# Patient Record
Sex: FEMALE | Race: ASIAN | NOT HISPANIC OR LATINO | URBAN - METROPOLITAN AREA
[De-identification: names, ages, dates, MRNs, and addresses within clinical notes are randomized per-mention and may not be internally consistent; named-entity substitution may affect disease eponyms.]

---

## 2017-07-26 ENCOUNTER — INPATIENT (INPATIENT)
Facility: HOSPITAL | Age: 32
LOS: 1 days | Discharge: HOME | End: 2017-07-28
Attending: OBSTETRICS & GYNECOLOGY | Admitting: OBSTETRICS & GYNECOLOGY
Payer: COMMERCIAL

## 2017-07-26 VITALS — WEIGHT: 185.19 LBS | HEIGHT: 66 IN

## 2017-07-26 DIAGNOSIS — Z34.80 ENCOUNTER FOR SUPERVISION OF OTHER NORMAL PREGNANCY, UNSPECIFIED TRIMESTER: ICD-10-CM

## 2017-07-26 DIAGNOSIS — Z3A.00 WEEKS OF GESTATION OF PREGNANCY NOT SPECIFIED: ICD-10-CM

## 2017-07-26 DIAGNOSIS — O26.899 OTHER SPECIFIED PREGNANCY RELATED CONDITIONS, UNSPECIFIED TRIMESTER: ICD-10-CM

## 2017-07-26 LAB
APTT BLD: 27.6 SEC — SIGNIFICANT CHANGE UP (ref 27.5–37.4)
BASOPHILS # BLD AUTO: 0.1 K/UL — SIGNIFICANT CHANGE UP (ref 0–0.2)
BASOPHILS # BLD AUTO: 0.1 K/UL — SIGNIFICANT CHANGE UP (ref 0–0.2)
BASOPHILS NFR BLD AUTO: 0.6 % — SIGNIFICANT CHANGE UP (ref 0–2)
BASOPHILS NFR BLD AUTO: 1 % — SIGNIFICANT CHANGE UP (ref 0–2)
EOSINOPHIL # BLD AUTO: 0.1 K/UL — SIGNIFICANT CHANGE UP (ref 0–0.5)
EOSINOPHIL # BLD AUTO: 0.5 K/UL — SIGNIFICANT CHANGE UP (ref 0–0.5)
EOSINOPHIL NFR BLD AUTO: 1.1 % — SIGNIFICANT CHANGE UP (ref 0–6)
EOSINOPHIL NFR BLD AUTO: 4.5 % — SIGNIFICANT CHANGE UP (ref 0–6)
FIBRINOGEN PPP-MCNC: 834 MG/DL — HIGH (ref 310–510)
HCT VFR BLD CALC: 27.6 % — LOW (ref 34.5–45)
HCT VFR BLD CALC: 36.7 % — SIGNIFICANT CHANGE UP (ref 34.5–45)
HGB BLD-MCNC: 12.4 G/DL — SIGNIFICANT CHANGE UP (ref 11.5–15.5)
HGB BLD-MCNC: 9.5 G/DL — LOW (ref 11.5–15.5)
INR BLD: 0.96 RATIO — SIGNIFICANT CHANGE UP (ref 0.88–1.16)
LYMPHOCYTES # BLD AUTO: 1.9 K/UL — SIGNIFICANT CHANGE UP (ref 1–3.3)
LYMPHOCYTES # BLD AUTO: 15.5 % — SIGNIFICANT CHANGE UP (ref 13–44)
LYMPHOCYTES # BLD AUTO: 2.9 K/UL — SIGNIFICANT CHANGE UP (ref 1–3.3)
LYMPHOCYTES # BLD AUTO: 28.2 % — SIGNIFICANT CHANGE UP (ref 13–44)
MCHC RBC-ENTMCNC: 25.8 PG — LOW (ref 27–34)
MCHC RBC-ENTMCNC: 26.4 PG — LOW (ref 27–34)
MCHC RBC-ENTMCNC: 33.8 GM/DL — SIGNIFICANT CHANGE UP (ref 32–36)
MCHC RBC-ENTMCNC: 34.5 GM/DL — SIGNIFICANT CHANGE UP (ref 32–36)
MCV RBC AUTO: 76.3 FL — LOW (ref 80–100)
MCV RBC AUTO: 76.6 FL — LOW (ref 80–100)
MONOCYTES # BLD AUTO: 1.1 K/UL — HIGH (ref 0–0.9)
MONOCYTES # BLD AUTO: 1.2 K/UL — HIGH (ref 0–0.9)
MONOCYTES NFR BLD AUTO: 10.8 % — SIGNIFICANT CHANGE UP (ref 2–14)
MONOCYTES NFR BLD AUTO: 9.7 % — SIGNIFICANT CHANGE UP (ref 2–14)
NEUTROPHILS # BLD AUTO: 5.6 K/UL — SIGNIFICANT CHANGE UP (ref 1.8–7.4)
NEUTROPHILS # BLD AUTO: 8.9 K/UL — HIGH (ref 1.8–7.4)
NEUTROPHILS NFR BLD AUTO: 55.5 % — SIGNIFICANT CHANGE UP (ref 43–77)
NEUTROPHILS NFR BLD AUTO: 73.1 % — SIGNIFICANT CHANGE UP (ref 43–77)
PLATELET # BLD AUTO: 246 K/UL — SIGNIFICANT CHANGE UP (ref 150–400)
PLATELET # BLD AUTO: 291 K/UL — SIGNIFICANT CHANGE UP (ref 150–400)
PROTHROM AB SERPL-ACNC: 10.5 SEC — SIGNIFICANT CHANGE UP (ref 9.8–12.7)
RBC # BLD: 3.61 M/UL — LOW (ref 3.8–5.2)
RBC # BLD: 4.8 M/UL — SIGNIFICANT CHANGE UP (ref 3.8–5.2)
RBC # FLD: 14 % — SIGNIFICANT CHANGE UP (ref 10.3–14.5)
RBC # FLD: 14.1 % — SIGNIFICANT CHANGE UP (ref 10.3–14.5)
T PALLIDUM AB TITR SER: NEGATIVE — SIGNIFICANT CHANGE UP
WBC # BLD: 10.2 K/UL — SIGNIFICANT CHANGE UP (ref 3.8–10.5)
WBC # BLD: 12.2 K/UL — HIGH (ref 3.8–10.5)
WBC # FLD AUTO: 10.2 K/UL — SIGNIFICANT CHANGE UP (ref 3.8–10.5)
WBC # FLD AUTO: 12.2 K/UL — HIGH (ref 3.8–10.5)

## 2017-07-26 RX ORDER — OXYTOCIN 10 UNIT/ML
41.67 VIAL (ML) INJECTION
Qty: 20 | Refills: 0 | Status: DISCONTINUED | OUTPATIENT
Start: 2017-07-26 | End: 2017-07-28

## 2017-07-26 RX ORDER — FOLIC ACID 0.8 MG
1 TABLET ORAL DAILY
Qty: 0 | Refills: 0 | Status: DISCONTINUED | OUTPATIENT
Start: 2017-07-26 | End: 2017-07-28

## 2017-07-26 RX ORDER — FERROUS SULFATE 325(65) MG
325 TABLET ORAL DAILY
Qty: 0 | Refills: 0 | Status: DISCONTINUED | OUTPATIENT
Start: 2017-07-26 | End: 2017-07-27

## 2017-07-26 RX ORDER — HYDROCORTISONE 1 %
1 OINTMENT (GRAM) TOPICAL EVERY 4 HOURS
Qty: 0 | Refills: 0 | Status: DISCONTINUED | OUTPATIENT
Start: 2017-07-26 | End: 2017-07-28

## 2017-07-26 RX ORDER — OXYCODONE AND ACETAMINOPHEN 5; 325 MG/1; MG/1
2 TABLET ORAL EVERY 4 HOURS
Qty: 0 | Refills: 0 | Status: DISCONTINUED | OUTPATIENT
Start: 2017-07-26 | End: 2017-07-28

## 2017-07-26 RX ORDER — SIMETHICONE 80 MG/1
80 TABLET, CHEWABLE ORAL EVERY 6 HOURS
Qty: 0 | Refills: 0 | Status: DISCONTINUED | OUTPATIENT
Start: 2017-07-26 | End: 2017-07-28

## 2017-07-26 RX ORDER — TETANUS TOXOID, REDUCED DIPHTHERIA TOXOID AND ACELLULAR PERTUSSIS VACCINE, ADSORBED 5; 2.5; 8; 8; 2.5 [IU]/.5ML; [IU]/.5ML; UG/.5ML; UG/.5ML; UG/.5ML
0.5 SUSPENSION INTRAMUSCULAR ONCE
Qty: 0 | Refills: 0 | Status: DISCONTINUED | OUTPATIENT
Start: 2017-07-26 | End: 2017-07-28

## 2017-07-26 RX ORDER — LANOLIN
1 OINTMENT (GRAM) TOPICAL EVERY 6 HOURS
Qty: 0 | Refills: 0 | Status: DISCONTINUED | OUTPATIENT
Start: 2017-07-26 | End: 2017-07-28

## 2017-07-26 RX ORDER — SODIUM CHLORIDE 9 MG/ML
1000 INJECTION, SOLUTION INTRAVENOUS
Qty: 0 | Refills: 0 | Status: DISCONTINUED | OUTPATIENT
Start: 2017-07-26 | End: 2017-07-26

## 2017-07-26 RX ORDER — DIPHENHYDRAMINE HCL 50 MG
25 CAPSULE ORAL EVERY 6 HOURS
Qty: 0 | Refills: 0 | Status: DISCONTINUED | OUTPATIENT
Start: 2017-07-26 | End: 2017-07-28

## 2017-07-26 RX ORDER — SODIUM CHLORIDE 9 MG/ML
3 INJECTION INTRAMUSCULAR; INTRAVENOUS; SUBCUTANEOUS EVERY 8 HOURS
Qty: 0 | Refills: 0 | Status: DISCONTINUED | OUTPATIENT
Start: 2017-07-26 | End: 2017-07-28

## 2017-07-26 RX ORDER — OXYTOCIN 10 UNIT/ML
333.33 VIAL (ML) INJECTION
Qty: 20 | Refills: 0 | Status: DISCONTINUED | OUTPATIENT
Start: 2017-07-26 | End: 2017-07-26

## 2017-07-26 RX ORDER — ACETAMINOPHEN 500 MG
650 TABLET ORAL EVERY 4 HOURS
Qty: 0 | Refills: 0 | Status: DISCONTINUED | OUTPATIENT
Start: 2017-07-26 | End: 2017-07-28

## 2017-07-26 RX ORDER — BENZOCAINE 10 %
1 GEL (GRAM) MUCOUS MEMBRANE EVERY 6 HOURS
Qty: 0 | Refills: 0 | Status: DISCONTINUED | OUTPATIENT
Start: 2017-07-26 | End: 2017-07-28

## 2017-07-26 RX ORDER — PRAMOXINE HYDROCHLORIDE 150 MG/15G
1 AEROSOL, FOAM RECTAL EVERY 4 HOURS
Qty: 0 | Refills: 0 | Status: DISCONTINUED | OUTPATIENT
Start: 2017-07-26 | End: 2017-07-28

## 2017-07-26 RX ORDER — AER TRAVELER 0.5 G/1
1 SOLUTION RECTAL; TOPICAL EVERY 4 HOURS
Qty: 0 | Refills: 0 | Status: DISCONTINUED | OUTPATIENT
Start: 2017-07-26 | End: 2017-07-28

## 2017-07-26 RX ORDER — ACETAMINOPHEN 500 MG
650 TABLET ORAL EVERY 6 HOURS
Qty: 0 | Refills: 0 | Status: DISCONTINUED | OUTPATIENT
Start: 2017-07-26 | End: 2017-07-28

## 2017-07-26 RX ORDER — DIBUCAINE 1 %
1 OINTMENT (GRAM) RECTAL EVERY 4 HOURS
Qty: 0 | Refills: 0 | Status: DISCONTINUED | OUTPATIENT
Start: 2017-07-26 | End: 2017-07-28

## 2017-07-26 RX ORDER — CITRIC ACID/SODIUM CITRATE 300-500 MG
15 SOLUTION, ORAL ORAL EVERY 4 HOURS
Qty: 0 | Refills: 0 | Status: DISCONTINUED | OUTPATIENT
Start: 2017-07-26 | End: 2017-07-26

## 2017-07-26 RX ORDER — DOCUSATE SODIUM 100 MG
100 CAPSULE ORAL
Qty: 0 | Refills: 0 | Status: DISCONTINUED | OUTPATIENT
Start: 2017-07-26 | End: 2017-07-28

## 2017-07-26 RX ORDER — ZOLPIDEM TARTRATE 10 MG/1
5 TABLET ORAL AT BEDTIME
Qty: 0 | Refills: 0 | Status: DISCONTINUED | OUTPATIENT
Start: 2017-07-26 | End: 2017-07-28

## 2017-07-26 RX ORDER — SODIUM CHLORIDE 9 MG/ML
1000 INJECTION, SOLUTION INTRAVENOUS ONCE
Qty: 0 | Refills: 0 | Status: COMPLETED | OUTPATIENT
Start: 2017-07-26 | End: 2017-07-26

## 2017-07-26 RX ORDER — IBUPROFEN 200 MG
600 TABLET ORAL EVERY 6 HOURS
Qty: 0 | Refills: 0 | Status: DISCONTINUED | OUTPATIENT
Start: 2017-07-26 | End: 2017-07-28

## 2017-07-26 RX ADMIN — Medication 125 MILLIUNIT(S)/MIN: at 11:01

## 2017-07-26 RX ADMIN — SODIUM CHLORIDE 2000 MILLILITER(S): 9 INJECTION, SOLUTION INTRAVENOUS at 08:00

## 2017-07-26 RX ADMIN — OXYCODONE AND ACETAMINOPHEN 2 TABLET(S): 5; 325 TABLET ORAL at 10:09

## 2017-07-26 RX ADMIN — SODIUM CHLORIDE 125 MILLILITER(S): 9 INJECTION, SOLUTION INTRAVENOUS at 08:43

## 2017-07-26 RX ADMIN — Medication 325 MILLIGRAM(S): at 15:59

## 2017-07-26 RX ADMIN — SODIUM CHLORIDE 3 MILLILITER(S): 9 INJECTION INTRAMUSCULAR; INTRAVENOUS; SUBCUTANEOUS at 14:49

## 2017-07-26 RX ADMIN — SODIUM CHLORIDE 3 MILLILITER(S): 9 INJECTION INTRAMUSCULAR; INTRAVENOUS; SUBCUTANEOUS at 22:00

## 2017-07-26 RX ADMIN — Medication 1 TABLET(S): at 15:59

## 2017-07-26 RX ADMIN — Medication 1 MILLIGRAM(S): at 16:00

## 2017-07-26 RX ADMIN — Medication 600 MILLIGRAM(S): at 16:00

## 2017-07-26 NOTE — PATIENT PROFILE OB - WEIGHT: TOTAL WEIGHT IN KG
FREE:[LAST:[OB/GYN],PHONE:[(   )    -],FAX:[(   )    -],ADDRESS:[[address],   [place] [address]  Ph: [phone]]] 26 FREE:[LAST:[Tino],FIRST:[Galo],PHONE:[phone]:[(   )    -],ADDRESS:[[address] , [place] [address]]]

## 2017-07-27 ENCOUNTER — TRANSCRIPTION ENCOUNTER (OUTPATIENT)
Age: 32
End: 2017-07-27

## 2017-07-27 RX ORDER — FERROUS SULFATE 325(65) MG
325 TABLET ORAL
Qty: 0 | Refills: 0 | Status: DISCONTINUED | OUTPATIENT
Start: 2017-07-27 | End: 2017-07-28

## 2017-07-27 RX ORDER — ASCORBIC ACID 60 MG
500 TABLET,CHEWABLE ORAL DAILY
Qty: 0 | Refills: 0 | Status: DISCONTINUED | OUTPATIENT
Start: 2017-07-27 | End: 2017-07-28

## 2017-07-27 RX ADMIN — Medication 600 MILLIGRAM(S): at 08:51

## 2017-07-27 RX ADMIN — Medication 600 MILLIGRAM(S): at 15:40

## 2017-07-27 RX ADMIN — Medication 325 MILLIGRAM(S): at 12:02

## 2017-07-27 RX ADMIN — Medication 600 MILLIGRAM(S): at 09:30

## 2017-07-27 RX ADMIN — SODIUM CHLORIDE 3 MILLILITER(S): 9 INJECTION INTRAMUSCULAR; INTRAVENOUS; SUBCUTANEOUS at 22:28

## 2017-07-27 RX ADMIN — Medication 100 MILLIGRAM(S): at 12:02

## 2017-07-27 RX ADMIN — Medication 600 MILLIGRAM(S): at 15:07

## 2017-07-27 RX ADMIN — Medication 325 MILLIGRAM(S): at 08:51

## 2017-07-27 RX ADMIN — SODIUM CHLORIDE 3 MILLILITER(S): 9 INJECTION INTRAMUSCULAR; INTRAVENOUS; SUBCUTANEOUS at 14:15

## 2017-07-27 RX ADMIN — Medication 500 MILLIGRAM(S): at 12:04

## 2017-07-27 RX ADMIN — Medication 1 MILLIGRAM(S): at 12:04

## 2017-07-27 RX ADMIN — SODIUM CHLORIDE 3 MILLILITER(S): 9 INJECTION INTRAMUSCULAR; INTRAVENOUS; SUBCUTANEOUS at 07:04

## 2017-07-27 RX ADMIN — Medication 1 TABLET(S): at 12:04

## 2017-07-27 RX ADMIN — Medication 325 MILLIGRAM(S): at 17:05

## 2017-07-27 NOTE — DISCHARGE NOTE OB - CARE PLAN
Principal Discharge DX:	 (vaginal birth after )  Goal:	pain management and breast feeding  Instructions for follow-up, activity and diet:	Continue breastfeeding.  Motrin as needed for pain. Avoid strenuous activity, no heavy lifting. Nothing per vagina x 6 weeks - no sex, tampons, douching, tub baths, etc.  Follow up in office in 5-6 weeks for check up

## 2017-07-27 NOTE — DISCHARGE NOTE OB - PLAN OF CARE
pain management and breast feeding Continue breastfeeding.  Motrin as needed for pain. Avoid strenuous activity, no heavy lifting. Nothing per vagina x 6 weeks - no sex, tampons, douching, tub baths, etc.  Follow up in office in 5-6 weeks for check up

## 2017-07-27 NOTE — DISCHARGE NOTE OB - HOSPITAL COURSE
Patient admitted in active labor, wanting trial of labor after . s/p vaginal birth after ; Routine post partum care  Discharged home clinically stable

## 2017-07-27 NOTE — DISCHARGE NOTE OB - MEDICATION SUMMARY - MEDICATIONS TO TAKE
I will START or STAY ON the medications listed below when I get home from the hospital:    Motrin 600 mg oral tablet  -- 1 tab(s) by mouth 4 times a day, As Needed  -- Indication: For pain, as needed    benzocaine 20% topical spray  -- 1 spray(s) on skin every 6 hours, As needed, Perineal discomfort  -- Indication: For perineal pain    ferrous sulfate 325 mg (65 mg elemental iron) oral tablet  -- 1 tab(s) by mouth 2 times a day  -- Indication: For anemia    docusate sodium 100 mg oral capsule  -- 1 cap(s) by mouth 2 times a day, As needed, Stool Softening  -- Indication: For Stool softener    Dulcolax Laxative 5 mg oral delayed release tablet  -- 1 tab(s) by mouth once a day (at bedtime)  -- Swallow whole.  Do not crush.    -- Indication: For constipation, laxative

## 2017-07-27 NOTE — DISCHARGE NOTE OB - CARE PROVIDER_API CALL
Olanescu, Andrea D (MD), Obstetrics and Gynecology  [address] [address] [place] [address]  Phone: [phone]  Fax: [phone]

## 2017-07-27 NOTE — DISCHARGE NOTE OB - PATIENT PORTAL LINK FT
“You can access the FollowHealth Patient Portal, offered by [facility], by registering with the following website: [email or web address]”

## 2017-07-27 NOTE — PROGRESS NOTE ADULT - ASSESSMENT
Assessment  Patient is a 30 yo 1 day s/p  without complications.    Plan   continue pp care  pain management PRN, ibuprofen 600mg q6h, percocet 1 tab q4h   dermoplast prn perineal pain prn  encourage ambulation, breastfeeding

## 2017-07-28 VITALS
RESPIRATION RATE: 16 BRPM | HEART RATE: 71 BPM | SYSTOLIC BLOOD PRESSURE: 98 MMHG | TEMPERATURE: 98 F | OXYGEN SATURATION: 99 % | DIASTOLIC BLOOD PRESSURE: 56 MMHG

## 2017-07-28 PROCEDURE — 59050 FETAL MONITOR W/REPORT: CPT

## 2017-07-28 PROCEDURE — 86780 TREPONEMA PALLIDUM: CPT

## 2017-07-28 PROCEDURE — 86920 COMPATIBILITY TEST SPIN: CPT

## 2017-07-28 PROCEDURE — 59025 FETAL NON-STRESS TEST: CPT

## 2017-07-28 PROCEDURE — 86850 RBC ANTIBODY SCREEN: CPT

## 2017-07-28 PROCEDURE — 85384 FIBRINOGEN ACTIVITY: CPT

## 2017-07-28 PROCEDURE — G0463: CPT

## 2017-07-28 PROCEDURE — 36415 COLL VENOUS BLD VENIPUNCTURE: CPT

## 2017-07-28 PROCEDURE — 86901 BLOOD TYPING SEROLOGIC RH(D): CPT

## 2017-07-28 PROCEDURE — 86900 BLOOD TYPING SEROLOGIC ABO: CPT

## 2017-07-28 PROCEDURE — 85027 COMPLETE CBC AUTOMATED: CPT

## 2017-07-28 PROCEDURE — 85730 THROMBOPLASTIN TIME PARTIAL: CPT

## 2017-07-28 PROCEDURE — 85610 PROTHROMBIN TIME: CPT

## 2017-07-28 RX ORDER — BENZOCAINE 10 %
1 GEL (GRAM) MUCOUS MEMBRANE
Qty: 1 | Refills: 0
Start: 2017-07-28 | End: 2017-08-07

## 2017-07-28 RX ORDER — FERROUS SULFATE 325(65) MG
1 TABLET ORAL
Qty: 60 | Refills: 0
Start: 2017-07-28 | End: 2017-08-27

## 2017-07-28 RX ORDER — DOCUSATE SODIUM 100 MG
1 CAPSULE ORAL
Qty: 20 | Refills: 0
Start: 2017-07-28 | End: 2017-08-07

## 2017-07-28 RX ORDER — IBUPROFEN 200 MG
1 TABLET ORAL
Qty: 40 | Refills: 0
Start: 2017-07-28 | End: 2017-08-07

## 2017-07-28 RX ADMIN — SODIUM CHLORIDE 3 MILLILITER(S): 9 INJECTION INTRAMUSCULAR; INTRAVENOUS; SUBCUTANEOUS at 06:42

## 2017-07-28 RX ADMIN — Medication 325 MILLIGRAM(S): at 11:45

## 2017-07-28 RX ADMIN — Medication 500 MILLIGRAM(S): at 11:45

## 2017-07-28 RX ADMIN — Medication 1 TABLET(S): at 11:45

## 2017-07-28 RX ADMIN — Medication 1 MILLIGRAM(S): at 11:45

## 2017-07-28 RX ADMIN — Medication 325 MILLIGRAM(S): at 08:49

## 2017-07-28 NOTE — PROGRESS NOTE ADULT - SUBJECTIVE AND OBJECTIVE BOX
Patient seen resting comfortably.  No current complaints.  Denies HA, CP, SOB, N/V/D, dizziness, palpitations, worsening abdominal pain, worsening vaginal bleeding, or any other concerns.  Ambulating and voiding without difficulty.  Passing flatus and tolerating regular diet.  Attempting breastfeeding.     Vital Signs Last 24 Hrs  T(C): 36.7 (28 Jul 2017 05:48), Max: 36.7 (27 Jul 2017 19:50)  T(F): 98 (28 Jul 2017 05:48), Max: 98 (27 Jul 2017 19:50)  HR: 71 (28 Jul 2017 05:48) (71 - 86)  BP: 98/56 (28 Jul 2017 05:48) (98/56 - 118/71)  BP(mean): --  RR: 16 (28 Jul 2017 05:48) (16 - 16)  SpO2: 99% (28 Jul 2017 05:48) (99% - 99%)    Physical Exam:     Gen: A&Ox 3, NAD  Chest: CTABL  Cardiac: S1+S2+ RRR  Breast: Soft, nontender, nonengorged  Abdomen: Soft, nontender, Fundus firm below umbilicus, +BS  Gyn: Mild lochia, intact/repaired  Extremities: Nontender, DTRS 2+, no worsening edema                          9.5    12.2  )-----------( 246      ( 26 Jul 2017 20:46 )             27.6

## 2017-07-28 NOTE — PROGRESS NOTE ADULT - ASSESSMENT
PPD 2 in stable condition   A/P:  Postpartum day two in stable condition   - Discharge home with instructions  -Follow up in office in 5-6 weeks for postpartum visit  -Breastfeeding encouraged   -Continue pain management  -Encourage OOB and ambulation  -Continue current care

## 2017-08-01 DIAGNOSIS — Z3A.38 38 WEEKS GESTATION OF PREGNANCY: ICD-10-CM

## 2017-08-01 DIAGNOSIS — O34.211 MATERNAL CARE FOR LOW TRANSVERSE SCAR FROM PREVIOUS CESAREAN DELIVERY: ICD-10-CM

## 2021-03-22 ENCOUNTER — RESULT REVIEW (OUTPATIENT)
Age: 36
End: 2021-03-22

## 2021-03-22 ENCOUNTER — INPATIENT (INPATIENT)
Facility: HOSPITAL | Age: 36
LOS: 1 days | Discharge: HOME | End: 2021-03-24
Attending: SPECIALIST | Admitting: SPECIALIST
Payer: COMMERCIAL

## 2021-03-22 VITALS — SYSTOLIC BLOOD PRESSURE: 127 MMHG

## 2021-03-22 DIAGNOSIS — O26.899 OTHER SPECIFIED PREGNANCY RELATED CONDITIONS, UNSPECIFIED TRIMESTER: ICD-10-CM

## 2021-03-22 DIAGNOSIS — Z3A.00 WEEKS OF GESTATION OF PREGNANCY NOT SPECIFIED: ICD-10-CM

## 2021-03-22 DIAGNOSIS — Z98.891 HISTORY OF UTERINE SCAR FROM PREVIOUS SURGERY: Chronic | ICD-10-CM

## 2021-03-22 LAB
BASOPHILS # BLD AUTO: 0.04 K/UL — SIGNIFICANT CHANGE UP (ref 0–0.2)
BASOPHILS NFR BLD AUTO: 0.4 % — SIGNIFICANT CHANGE UP (ref 0–2)
EOSINOPHIL # BLD AUTO: 0.16 K/UL — SIGNIFICANT CHANGE UP (ref 0–0.5)
EOSINOPHIL NFR BLD AUTO: 1.4 % — SIGNIFICANT CHANGE UP (ref 0–6)
HCT VFR BLD CALC: 37.5 % — SIGNIFICANT CHANGE UP (ref 34.5–45)
HGB BLD-MCNC: 12.1 G/DL — SIGNIFICANT CHANGE UP (ref 11.5–15.5)
IANC: 8.48 K/UL — SIGNIFICANT CHANGE UP (ref 1.5–8.5)
IMM GRANULOCYTES NFR BLD AUTO: 1.2 % — SIGNIFICANT CHANGE UP (ref 0–1.5)
LYMPHOCYTES # BLD AUTO: 1.32 K/UL — SIGNIFICANT CHANGE UP (ref 1–3.3)
LYMPHOCYTES # BLD AUTO: 11.7 % — LOW (ref 13–44)
MCHC RBC-ENTMCNC: 25.4 PG — LOW (ref 27–34)
MCHC RBC-ENTMCNC: 32.3 GM/DL — SIGNIFICANT CHANGE UP (ref 32–36)
MCV RBC AUTO: 78.8 FL — LOW (ref 80–100)
MONOCYTES # BLD AUTO: 1.17 K/UL — HIGH (ref 0–0.9)
MONOCYTES NFR BLD AUTO: 10.4 % — SIGNIFICANT CHANGE UP (ref 2–14)
NEUTROPHILS # BLD AUTO: 8.48 K/UL — HIGH (ref 1.8–7.4)
NEUTROPHILS NFR BLD AUTO: 74.9 % — SIGNIFICANT CHANGE UP (ref 43–77)
NRBC # BLD: 0 /100 WBCS — SIGNIFICANT CHANGE UP
NRBC # FLD: 0 K/UL — SIGNIFICANT CHANGE UP
PLATELET # BLD AUTO: 272 K/UL — SIGNIFICANT CHANGE UP (ref 150–400)
RBC # BLD: 4.76 M/UL — SIGNIFICANT CHANGE UP (ref 3.8–5.2)
RBC # FLD: 15.3 % — HIGH (ref 10.3–14.5)
SARS-COV-2 RNA SPEC QL NAA+PROBE: SIGNIFICANT CHANGE UP
WBC # BLD: 11.3 K/UL — HIGH (ref 3.8–10.5)
WBC # FLD AUTO: 11.3 K/UL — HIGH (ref 3.8–10.5)

## 2021-03-22 PROCEDURE — 59409 OBSTETRICAL CARE: CPT | Mod: U9,UB,GC

## 2021-03-22 PROCEDURE — 88307 TISSUE EXAM BY PATHOLOGIST: CPT | Mod: 26

## 2021-03-22 RX ORDER — SIMETHICONE 80 MG/1
80 TABLET, CHEWABLE ORAL EVERY 4 HOURS
Refills: 0 | Status: DISCONTINUED | OUTPATIENT
Start: 2021-03-22 | End: 2021-03-24

## 2021-03-22 RX ORDER — HYDROCORTISONE 1 %
1 OINTMENT (GRAM) TOPICAL EVERY 6 HOURS
Refills: 0 | Status: DISCONTINUED | OUTPATIENT
Start: 2021-03-22 | End: 2021-03-24

## 2021-03-22 RX ORDER — ERTAPENEM SODIUM 1 G/1
1000 INJECTION, POWDER, LYOPHILIZED, FOR SOLUTION INTRAMUSCULAR; INTRAVENOUS ONCE
Refills: 0 | Status: COMPLETED | OUTPATIENT
Start: 2021-03-22 | End: 2021-03-22

## 2021-03-22 RX ORDER — SODIUM CHLORIDE 9 MG/ML
500 INJECTION, SOLUTION INTRAVENOUS
Refills: 0 | Status: COMPLETED | OUTPATIENT
Start: 2021-03-22 | End: 2021-03-22

## 2021-03-22 RX ORDER — OXYTOCIN 10 UNIT/ML
333.33 VIAL (ML) INJECTION
Qty: 20 | Refills: 0 | Status: DISCONTINUED | OUTPATIENT
Start: 2021-03-22 | End: 2021-03-23

## 2021-03-22 RX ORDER — SODIUM CHLORIDE 9 MG/ML
1000 INJECTION, SOLUTION INTRAVENOUS
Refills: 0 | Status: DISCONTINUED | OUTPATIENT
Start: 2021-03-22 | End: 2021-03-22

## 2021-03-22 RX ORDER — GENTAMICIN SULFATE 40 MG/ML
350 VIAL (ML) INJECTION ONCE
Refills: 0 | Status: DISCONTINUED | OUTPATIENT
Start: 2021-03-22 | End: 2021-03-22

## 2021-03-22 RX ORDER — SODIUM CHLORIDE 9 MG/ML
500 INJECTION INTRAMUSCULAR; INTRAVENOUS; SUBCUTANEOUS ONCE
Refills: 0 | Status: DISCONTINUED | OUTPATIENT
Start: 2021-03-22 | End: 2021-03-22

## 2021-03-22 RX ORDER — DIPHENHYDRAMINE HCL 50 MG
25 CAPSULE ORAL EVERY 6 HOURS
Refills: 0 | Status: DISCONTINUED | OUTPATIENT
Start: 2021-03-22 | End: 2021-03-24

## 2021-03-22 RX ORDER — OXYTOCIN 10 UNIT/ML
2 VIAL (ML) INJECTION
Qty: 30 | Refills: 0 | Status: DISCONTINUED | OUTPATIENT
Start: 2021-03-22 | End: 2021-03-23

## 2021-03-22 RX ORDER — INFLUENZA VIRUS VACCINE 15; 15; 15; 15 UG/.5ML; UG/.5ML; UG/.5ML; UG/.5ML
0.5 SUSPENSION INTRAMUSCULAR ONCE
Refills: 0 | Status: COMPLETED | OUTPATIENT
Start: 2021-03-22 | End: 2021-03-22

## 2021-03-22 RX ORDER — MAGNESIUM HYDROXIDE 400 MG/1
30 TABLET, CHEWABLE ORAL
Refills: 0 | Status: DISCONTINUED | OUTPATIENT
Start: 2021-03-22 | End: 2021-03-24

## 2021-03-22 RX ORDER — LANOLIN
1 OINTMENT (GRAM) TOPICAL EVERY 6 HOURS
Refills: 0 | Status: DISCONTINUED | OUTPATIENT
Start: 2021-03-22 | End: 2021-03-24

## 2021-03-22 RX ORDER — SODIUM CHLORIDE 9 MG/ML
1000 INJECTION INTRAMUSCULAR; INTRAVENOUS; SUBCUTANEOUS
Refills: 0 | Status: DISCONTINUED | OUTPATIENT
Start: 2021-03-22 | End: 2021-03-22

## 2021-03-22 RX ORDER — DIBUCAINE 1 %
1 OINTMENT (GRAM) RECTAL EVERY 6 HOURS
Refills: 0 | Status: DISCONTINUED | OUTPATIENT
Start: 2021-03-22 | End: 2021-03-24

## 2021-03-22 RX ORDER — ACETAMINOPHEN 500 MG
975 TABLET ORAL ONCE
Refills: 0 | Status: COMPLETED | OUTPATIENT
Start: 2021-03-22 | End: 2021-03-22

## 2021-03-22 RX ORDER — OXYCODONE HYDROCHLORIDE 5 MG/1
5 TABLET ORAL
Refills: 0 | Status: DISCONTINUED | OUTPATIENT
Start: 2021-03-22 | End: 2021-03-24

## 2021-03-22 RX ORDER — AMPICILLIN TRIHYDRATE 250 MG
CAPSULE ORAL
Refills: 0 | Status: DISCONTINUED | OUTPATIENT
Start: 2021-03-22 | End: 2021-03-22

## 2021-03-22 RX ORDER — AER TRAVELER 0.5 G/1
1 SOLUTION RECTAL; TOPICAL EVERY 4 HOURS
Refills: 0 | Status: DISCONTINUED | OUTPATIENT
Start: 2021-03-22 | End: 2021-03-24

## 2021-03-22 RX ORDER — ACETAMINOPHEN 500 MG
975 TABLET ORAL
Refills: 0 | Status: DISCONTINUED | OUTPATIENT
Start: 2021-03-22 | End: 2021-03-24

## 2021-03-22 RX ORDER — IBUPROFEN 200 MG
600 TABLET ORAL EVERY 6 HOURS
Refills: 0 | Status: COMPLETED | OUTPATIENT
Start: 2021-03-22 | End: 2022-02-18

## 2021-03-22 RX ORDER — OXYTOCIN 10 UNIT/ML
333.33 VIAL (ML) INJECTION
Qty: 20 | Refills: 0 | Status: DISCONTINUED | OUTPATIENT
Start: 2021-03-22 | End: 2021-03-22

## 2021-03-22 RX ORDER — AMPICILLIN TRIHYDRATE 250 MG
2 CAPSULE ORAL ONCE
Refills: 0 | Status: COMPLETED | OUTPATIENT
Start: 2021-03-22 | End: 2021-03-22

## 2021-03-22 RX ORDER — KETOROLAC TROMETHAMINE 30 MG/ML
30 SYRINGE (ML) INJECTION ONCE
Refills: 0 | Status: DISCONTINUED | OUTPATIENT
Start: 2021-03-22 | End: 2021-03-22

## 2021-03-22 RX ORDER — OXYCODONE HYDROCHLORIDE 5 MG/1
5 TABLET ORAL ONCE
Refills: 0 | Status: DISCONTINUED | OUTPATIENT
Start: 2021-03-22 | End: 2021-03-24

## 2021-03-22 RX ORDER — LEVOTHYROXINE SODIUM 125 MCG
1 TABLET ORAL
Qty: 0 | Refills: 0 | DISCHARGE

## 2021-03-22 RX ORDER — SODIUM CHLORIDE 9 MG/ML
1000 INJECTION, SOLUTION INTRAVENOUS ONCE
Refills: 0 | Status: COMPLETED | OUTPATIENT
Start: 2021-03-22 | End: 2021-03-22

## 2021-03-22 RX ORDER — PRAMOXINE HYDROCHLORIDE 150 MG/15G
1 AEROSOL, FOAM RECTAL EVERY 4 HOURS
Refills: 0 | Status: DISCONTINUED | OUTPATIENT
Start: 2021-03-22 | End: 2021-03-24

## 2021-03-22 RX ORDER — TETANUS TOXOID, REDUCED DIPHTHERIA TOXOID AND ACELLULAR PERTUSSIS VACCINE, ADSORBED 5; 2.5; 8; 8; 2.5 [IU]/.5ML; [IU]/.5ML; UG/.5ML; UG/.5ML; UG/.5ML
0.5 SUSPENSION INTRAMUSCULAR ONCE
Refills: 0 | Status: DISCONTINUED | OUTPATIENT
Start: 2021-03-22 | End: 2021-03-24

## 2021-03-22 RX ORDER — SODIUM CHLORIDE 9 MG/ML
3 INJECTION INTRAMUSCULAR; INTRAVENOUS; SUBCUTANEOUS EVERY 8 HOURS
Refills: 0 | Status: DISCONTINUED | OUTPATIENT
Start: 2021-03-22 | End: 2021-03-24

## 2021-03-22 RX ORDER — SODIUM CHLORIDE 9 MG/ML
1000 INJECTION, SOLUTION INTRAVENOUS ONCE
Refills: 0 | Status: DISCONTINUED | OUTPATIENT
Start: 2021-03-22 | End: 2021-03-22

## 2021-03-22 RX ORDER — BENZOCAINE 10 %
1 GEL (GRAM) MUCOUS MEMBRANE EVERY 6 HOURS
Refills: 0 | Status: DISCONTINUED | OUTPATIENT
Start: 2021-03-22 | End: 2021-03-24

## 2021-03-22 RX ADMIN — SODIUM CHLORIDE 1000 MILLILITER(S): 9 INJECTION, SOLUTION INTRAVENOUS at 23:23

## 2021-03-22 RX ADMIN — SODIUM CHLORIDE 999 MILLILITER(S): 9 INJECTION, SOLUTION INTRAVENOUS at 15:31

## 2021-03-22 RX ADMIN — Medication 975 MILLIGRAM(S): at 21:29

## 2021-03-22 RX ADMIN — ERTAPENEM SODIUM 120 MILLIGRAM(S): 1 INJECTION, POWDER, LYOPHILIZED, FOR SOLUTION INTRAMUSCULAR; INTRAVENOUS at 23:07

## 2021-03-22 RX ADMIN — Medication 216 GRAM(S): at 21:37

## 2021-03-22 RX ADMIN — Medication 2 MILLIUNIT(S)/MIN: at 18:45

## 2021-03-22 RX ADMIN — Medication 1000 MILLIUNIT(S)/MIN: at 21:52

## 2021-03-22 RX ADMIN — Medication 30 MILLIGRAM(S): at 23:55

## 2021-03-22 RX ADMIN — SODIUM CHLORIDE 125 MILLILITER(S): 9 INJECTION, SOLUTION INTRAVENOUS at 19:07

## 2021-03-22 RX ADMIN — SODIUM CHLORIDE 125 MILLILITER(S): 9 INJECTION INTRAMUSCULAR; INTRAVENOUS; SUBCUTANEOUS at 20:44

## 2021-03-22 RX ADMIN — Medication 975 MILLIGRAM(S): at 23:06

## 2021-03-22 RX ADMIN — SODIUM CHLORIDE 125 MILLILITER(S): 9 INJECTION INTRAMUSCULAR; INTRAVENOUS; SUBCUTANEOUS at 20:27

## 2021-03-22 NOTE — OB RN DELIVERY SUMMARY - NS_SEPSISRSKCALC_OBGYN_ALL_OB_FT
EOS calculated successfully. EOS Risk Factor: 0.5/1000 live births ([facility] national incidence); GA=37w4d; Temp=101.48; ROM=21.8; GBS='Negative'; Antibiotics='No antibiotics or any antibiotics < 2 hrs prior to birth'

## 2021-03-22 NOTE — OB PROVIDER LABOR PROGRESS NOTE - ASSESSMENT
Admitted in Labor, SROM, TOLAC  - c/w pitocin  - maternal resuscitation  - peanut ball  - anticipate     C. Diamant, PGY-2  d/w Dr. Mccauley

## 2021-03-22 NOTE — OB PROVIDER TRIAGE NOTE - NSOBPROVIDERNOTE_OBGYN_ALL_OB_FT
[facility] Provider:  Dr Smith  EDC:  4/8/21.    Patient is a [age] y/o G P @ wks gestation who reports to triage with c/o since  Denies    AP Couse:  Meds -   Allergies    PMH  PSH  OB  GYN  SH    HR: 114 (22 Mar 2021 15:17) (114 - 116)  BP: 127/77 (22 Mar 2021 15:17) (127/77 - 127/-)  BP(mean): --  RR: 16 (22 Mar 2021 15:17) (16 - 16)  SpO2: 97% (22 Mar 2021 15:16) (97% - 97%)    Abdomen gravid, soft and nontender  Continue EFM  Grossly ruptured; clear fluids  SVE - 4-5/70/-3  Cephalic presentation  GBS - negative  Clinical EFW - 2782G    Discussed patient with   Plan: PNC Provider:  Dr Smith  EDC:  21.    Patient is a a y/o G P @ wks gestation who reports to triage with c/o since  Denies    AP Couse:  Meds -   Allergies    PMH/PSH/GYN/SADE - paz    Ob  - C/S - 2015 - 9lbs  -  - 2017 - 8+lbs    HR: 114 (22 Mar 2021 15:17) (114 - 116)  BP: 127/77 (22 Mar 2021 15:17) (127/77 - 127/-)  BP(mean): --  RR: 16 (22 Mar 2021 15:17) (16 - 16)  SpO2: 97% (22 Mar 2021 15:16) (97% - 97%)    Abdomen gravid, soft and nontender  Continue EFM  Grossly ruptured; clear fluids  SVE - 4-5/70/-3  Cephalic presentation  GBS - negative  Clinical EFW - 3629G    Discussed patient with   Plan: PNC Provider:  Dr Smith  EDC:  21.    Patient is a 35/o G 3 P  @ 37 4/7 wks gestation who reports to triage with c/o lof since Friday 3/19.  Denies vaginal bleeding.  Reports decreased fetal movements.    AP Course:  short/dilated cervix at 33wks.  States that she was given betamethasone and started on vaginal progesterone.  Meds - pnv & synthroid 112mcg daily  NKDA    PMH/PSH/GYN/SH - deneis  Ob  - C/S - 2015 - 9lbs  -  - 2017 - 8+lbs    HR: 114 (22 Mar 2021 15:17) (114 - 116)  BP: 127/77 (22 Mar 2021 15:17) (127/77 - 127/-)  BP(mean): --  RR: 16 (22 Mar 2021 15:17) (16 - 16)  SpO2: 97% (22 Mar 2021 15:16) (97% - 97%)    Abdomen gravid, soft and nontender  Continue EFM  Grossly ruptured; clear fluids  SVE - 4-5/70/-3  Cephalic presentation  GBS - negative  Clinical EFW - 3629G    Discussed patient with Dr. Biggs  Plan:  admit to l&dn for management. PN Provider:  Dr Smith  EDC:  21.    Patient is a 35/o G 3 P  @ 37 4/7 wks gestation who reports to triage with c/o lof since Friday 3/19.  Denies vaginal bleeding.  Reports decreased fetal movements.  Desires TOLAC.  AP Course:  short/dilated cervix at 33wks.  States that she was given betamethasone and started on vaginal progesterone.  Meds - pnv & synthroid 112mcg daily  Followed by Dr Hinton (endocrinologist) of [facility].  NKDA      PMH/PSH/GYN/SH - deneis  Ob  - C/S - 2015 - 9lbs  -  - 2017 - 8+lbs    HR: 114 (22 Mar 2021 15:17) (114 - 116)  BP: 127/77 (22 Mar 2021 15:17) (127/77 - 127/-)  RR: 16 (22 Mar 2021 15:17) (16 - 16)  SpO2: 97% (22 Mar 2021 15:16) (97% - 97%)    Abdomen gravid, soft and nontender  Continue EFM  Grossly ruptured; clear fluids  SVE - 4-5/70/-3  Cephalic presentation  GBS - negative  Clinical EFW - 3629G    Discussed patient with Dr. Biggs  Plan:  admit to l&d for management.

## 2021-03-22 NOTE — OB RN DELIVERY SUMMARY - NS_LABORCHARACTER_OBGYN_ALL_OB
Augmentation of labor/Febrile (>38C)/Internal electronic FM/External electronic FM/Antibiotics in labor/Chorioamnionitis

## 2021-03-22 NOTE — OB PROVIDER H&P - HISTORY OF PRESENT ILLNESS
PN Provider:  Dr Smith  EDC:  4/8/21.    Patient is a 35/o G 3 P 2002 @ 37 4/7 wks gestation who reports to triage with c/o lof since Friday 3/19.  Denies vaginal bleeding.  Reports decreased fetal movements.    AP Course:  short/dilated cervix at 33wks.  States that she was given betamethasone and started on vaginal progesterone.  Meds - pnv & synthroid 112mcg daily  NKDA

## 2021-03-22 NOTE — OB PROVIDER DELIVERY SUMMARY - NSPROVIDERDELIVERYNOTE_OBGYN_ALL_OB_FT
Spontaneous vaginal delivery of liveborn infant from SCOTT position. Head, shoulders, and body delivered easily. Infant was suctioned. No mec. Cord was clamped and cut and infant was passed to mother then peds. Placenta delivered intact with a 3 vessel cord. Fundal massage was given and uterine fundus was found to be firm. Vaginal exam revealed an intact cervix, vaginal walls and sulci. Patient had a 2nd degree laceration in the perineum that was repaired with 2.0 chromic suture. Excellent hemostasis was noted. Patient was stable. Count was correct x 2.    Meredith Guevara MD PGY4

## 2021-03-22 NOTE — OB PROVIDER TRIAGE NOTE - HISTORY OF PRESENT ILLNESS
PN Provider:  Dr Smith  EDC:  4/8/21.    Patient is a 35/o G 3 P 2002 @ 37 4/7 wks gestation who reports to triage with c/o lof since Friday 3/19.  Denies vaginal bleeding.  Reports decreased fetal movements.    AP Course:  short/dilated cervix at 33wks.  States that she was given betamethasone and started on vaginal progesterone.  Meds - pnv & synthroid 112mcg daily  NKDA       PN Provider:  Dr Smith  EDC:  4/8/21.    Patient is a 35/o G 3 P 2002 @ 37 4/7 wks gestation who reports to triage with c/o lof since Friday 3/19.  Denies vaginal bleeding.  Reports decreased fetal movements.  Desires TOLAC.  AP Course:  short/dilated cervix at 33wks.  States that she was given betamethasone and started on vaginal progesterone.  Meds - pnv & synthroid 112mcg daily  Followed by Dr Hinton (endocrinologist) of [facility].  NKDA

## 2021-03-22 NOTE — OB POSTPARTUM EVENT NOTE - NS_EVENTSUMMARY1_OBGYN_ALL_OB_FT
S/P  of viable female at 1324, delivery of placenta at 1327. Bilateral sulcul tears and 2nd degree noted by MD Mike with large amount of blood loss from site. 1 LR IV bolus, PIH labs, 2nd line placed, TXA administered and quick clot placed along with indwelling catheter by MD Mike. MD Griffiths and MD Diamond assisted at bedside. ************* THIS NOTE IS NOT VALID, ENTERED IN ERROR INCORRECT PATIENT CHART ************************

## 2021-03-22 NOTE — OB NEONATOLOGY/PEDIATRICIAN DELIVERY SUMMARY - NSPEDSNEONOTESA_OBGYN_ALL_OB_FT
This is a 37.4wk M born to a 34 y.o  mother via . Peds called to delivery for category 2 tracing. Maternal history significant for hypothyroidism (on synthroid). Pregnancy uncomplicated. Maternal BT A+. Prenatal labs are negative, non-reactive, and immune. GBS negative from 3/9. SROM at 0000 on 3/22, clear fluids. Baby emerged with terminal mec, crying spontaneously after stimulation. Cord clamping delayed x30 seconds. W/D/S/S. Void x1 in delivery room. APGARS 8/9. EOS: 3.21. Mom is COVID negative. Due to high EOS, baby admitted to NICU for r/u sepsis.  Would like to breast and bottlefeed, would like hep B, would like circ. This is a 37.4wk M born to a 34 y.o  mother via . Peds called to delivery for category 2 tracing. Maternal history significant for hypothyroidism (on synthroid). Pregnancy uncomplicated. Maternal BT A+. Prenatal labs are negative, non-reactive, and immune. GBS negative from 3/9. SROM at 0000 on 3/22, clear fluids. Baby emerged with terminal mec, crying spontaneously after stimulation. Cord clamping delayed x30 seconds. W/D/S/S. Void x1 in delivery room. APGARS 8/9. EOS: 3.21. Mom is COVID negative. Due to high EOS, baby admitted to NICU for r/o sepsis.  Would like to breast and bottlefeed, would like hep B, would like circ.

## 2021-03-22 NOTE — OB PROVIDER LABOR PROGRESS NOTE - ASSESSMENT
-start augmentation w/ pitocin  -IUPC placed  -patient counseled on early epidural in the case of uterine rupture and need for emergent cs, patient declines    d/w Dr. Jessi Serna PGY2

## 2021-03-22 NOTE — OB POSTPARTUM EVENT NOTE - NS_EVENTFINDINGS1_OBGYN_ALL_OB_FT
Repeat labs, continue to monitor. ************* THIS NOTE IS NOT VALID, ENTERED IN ERROR INCORRECT PATIENT CHART ************************

## 2021-03-22 NOTE — OB POSTPARTUM EVENT NOTE - NS_EVENTPTSUMMARY1_OBGYN_ALL_OB_FT
VS remained stable throughout PPH. ************* THIS NOTE IS NOT VALID, ENTERED IN ERROR INCORRECT PATIENT CHART ************************

## 2021-03-22 NOTE — OB PROVIDER H&P - NSHPPHYSICALEXAM_GEN_ALL_CORE
Vital Signs Last 24 Hrs  T(C): 37 (22 Mar 2021 15:17), Max: 37 (22 Mar 2021 15:17)  T(F): 98.6 (22 Mar 2021 15:17), Max: 98.6 (22 Mar 2021 15:17)  HR: 114 (22 Mar 2021 15:17) (114 - 116)  BP: 127/77 (22 Mar 2021 15:17) (127/77 - 127/-)  BP(mean): --  RR: 16 (22 Mar 2021 15:17) (16 - 16)  SpO2: 97% (22 Mar 2021 15:16) (97% - 97%)    Abdomen gravid, soft and nontender  Continue EFM  Grossly ruptured; clear fluids  SVE - 4-5/70/-3  Cephalic presentation  GBS - negative  Clinical EFW - 3629G

## 2021-03-23 ENCOUNTER — TRANSCRIPTION ENCOUNTER (OUTPATIENT)
Age: 36
End: 2021-03-23

## 2021-03-23 LAB
BASOPHILS # BLD AUTO: 0.05 K/UL — SIGNIFICANT CHANGE UP (ref 0–0.2)
BASOPHILS NFR BLD AUTO: 0.3 % — SIGNIFICANT CHANGE UP (ref 0–2)
COVID-19 SPIKE DOMAIN AB INTERP: NEGATIVE — SIGNIFICANT CHANGE UP
COVID-19 SPIKE DOMAIN ANTIBODY RESULT: 0.4 U/ML — SIGNIFICANT CHANGE UP
EOSINOPHIL # BLD AUTO: 0.15 K/UL — SIGNIFICANT CHANGE UP (ref 0–0.5)
EOSINOPHIL NFR BLD AUTO: 1 % — SIGNIFICANT CHANGE UP (ref 0–6)
HCT VFR BLD CALC: 29.5 % — LOW (ref 34.5–45)
HCT VFR BLD CALC: 34.3 % — LOW (ref 34.5–45)
HGB BLD-MCNC: 11.1 G/DL — LOW (ref 11.5–15.5)
HGB BLD-MCNC: 9.6 G/DL — LOW (ref 11.5–15.5)
IANC: 11.82 K/UL — HIGH (ref 1.5–8.5)
IMM GRANULOCYTES NFR BLD AUTO: 0.9 % — SIGNIFICANT CHANGE UP (ref 0–1.5)
LYMPHOCYTES # BLD AUTO: 1.31 K/UL — SIGNIFICANT CHANGE UP (ref 1–3.3)
LYMPHOCYTES # BLD AUTO: 8.9 % — LOW (ref 13–44)
MCHC RBC-ENTMCNC: 25.3 PG — LOW (ref 27–34)
MCHC RBC-ENTMCNC: 25.3 PG — LOW (ref 27–34)
MCHC RBC-ENTMCNC: 32.4 GM/DL — SIGNIFICANT CHANGE UP (ref 32–36)
MCHC RBC-ENTMCNC: 32.5 GM/DL — SIGNIFICANT CHANGE UP (ref 32–36)
MCV RBC AUTO: 77.6 FL — LOW (ref 80–100)
MCV RBC AUTO: 78.3 FL — LOW (ref 80–100)
MONOCYTES # BLD AUTO: 1.28 K/UL — HIGH (ref 0–0.9)
MONOCYTES NFR BLD AUTO: 8.7 % — SIGNIFICANT CHANGE UP (ref 2–14)
NEUTROPHILS # BLD AUTO: 11.82 K/UL — HIGH (ref 1.8–7.4)
NEUTROPHILS NFR BLD AUTO: 80.2 % — HIGH (ref 43–77)
NRBC # BLD: 0 /100 WBCS — SIGNIFICANT CHANGE UP
NRBC # BLD: 0 /100 WBCS — SIGNIFICANT CHANGE UP
NRBC # FLD: 0 K/UL — SIGNIFICANT CHANGE UP
NRBC # FLD: 0 K/UL — SIGNIFICANT CHANGE UP
PLATELET # BLD AUTO: 202 K/UL — SIGNIFICANT CHANGE UP (ref 150–400)
PLATELET # BLD AUTO: 223 K/UL — SIGNIFICANT CHANGE UP (ref 150–400)
RBC # BLD: 3.8 M/UL — SIGNIFICANT CHANGE UP (ref 3.8–5.2)
RBC # BLD: 4.38 M/UL — SIGNIFICANT CHANGE UP (ref 3.8–5.2)
RBC # FLD: 15.5 % — HIGH (ref 10.3–14.5)
RBC # FLD: 15.9 % — HIGH (ref 10.3–14.5)
SARS-COV-2 IGG+IGM SERPL QL IA: 0.4 U/ML — SIGNIFICANT CHANGE UP
SARS-COV-2 IGG+IGM SERPL QL IA: NEGATIVE — SIGNIFICANT CHANGE UP
T PALLIDUM AB TITR SER: NEGATIVE — SIGNIFICANT CHANGE UP
WBC # BLD: 14.75 K/UL — HIGH (ref 3.8–10.5)
WBC # BLD: 15.11 K/UL — HIGH (ref 3.8–10.5)
WBC # FLD AUTO: 14.75 K/UL — HIGH (ref 3.8–10.5)
WBC # FLD AUTO: 15.11 K/UL — HIGH (ref 3.8–10.5)

## 2021-03-23 RX ORDER — IBUPROFEN 200 MG
1 TABLET ORAL
Qty: 0 | Refills: 0 | DISCHARGE
Start: 2021-03-23

## 2021-03-23 RX ORDER — ACETAMINOPHEN 500 MG
3 TABLET ORAL
Qty: 0 | Refills: 0 | DISCHARGE
Start: 2021-03-23

## 2021-03-23 RX ORDER — SODIUM CHLORIDE 9 MG/ML
1000 INJECTION, SOLUTION INTRAVENOUS
Refills: 0 | Status: DISCONTINUED | OUTPATIENT
Start: 2021-03-23 | End: 2021-03-23

## 2021-03-23 RX ORDER — IBUPROFEN 200 MG
600 TABLET ORAL EVERY 6 HOURS
Refills: 0 | Status: DISCONTINUED | OUTPATIENT
Start: 2021-03-23 | End: 2021-03-24

## 2021-03-23 RX ORDER — LEVOTHYROXINE SODIUM 125 MCG
112 TABLET ORAL DAILY
Refills: 0 | Status: DISCONTINUED | OUTPATIENT
Start: 2021-03-23 | End: 2021-03-24

## 2021-03-23 RX ADMIN — Medication 1 TABLET(S): at 11:33

## 2021-03-23 RX ADMIN — Medication 112 MICROGRAM(S): at 06:39

## 2021-03-23 RX ADMIN — Medication 600 MILLIGRAM(S): at 11:33

## 2021-03-23 RX ADMIN — Medication 975 MILLIGRAM(S): at 02:42

## 2021-03-23 RX ADMIN — Medication 600 MILLIGRAM(S): at 23:42

## 2021-03-23 RX ADMIN — Medication 600 MILLIGRAM(S): at 11:35

## 2021-03-23 RX ADMIN — Medication 975 MILLIGRAM(S): at 20:46

## 2021-03-23 RX ADMIN — Medication 975 MILLIGRAM(S): at 10:05

## 2021-03-23 RX ADMIN — Medication 975 MILLIGRAM(S): at 09:27

## 2021-03-23 RX ADMIN — Medication 600 MILLIGRAM(S): at 20:03

## 2021-03-23 RX ADMIN — Medication 600 MILLIGRAM(S): at 19:08

## 2021-03-23 RX ADMIN — Medication 975 MILLIGRAM(S): at 03:45

## 2021-03-23 RX ADMIN — SODIUM CHLORIDE 3 MILLILITER(S): 9 INJECTION INTRAMUSCULAR; INTRAVENOUS; SUBCUTANEOUS at 14:00

## 2021-03-23 RX ADMIN — Medication 30 MILLIGRAM(S): at 00:15

## 2021-03-23 RX ADMIN — Medication 975 MILLIGRAM(S): at 21:30

## 2021-03-23 NOTE — PROVIDER CONTACT NOTE (OTHER) - SITUATION
s/p / . , 2nd degree laceration. EOS 3.21- baby to NICU at 2216
s/p / . EBl 250. 2nd degree laceration
Pt feelings Dizzy and lightheaded, VS in flow sheet.

## 2021-03-23 NOTE — DISCHARGE NOTE OB - HOSPITAL COURSE
Patient was had an uncomplicated  () and postpartum course c/b acute blood loss anemia (Hct 37.5->29.5). Patient was transfused 2u pRBCs with appropriate hct rise. On PPD#2 she was discharged with no symptoms of anemia and with normal VS, adequate pain control, ambulating, tolerating PO and voiding spontaneously.  Patient was had an uncomplicated  () and postpartum course c/b chorio/endometritis treated with Invanz, and acute blood loss anemia (Hct 37.5->29.5). Patient was transfused 1u pRBCs with appropriate H/H rise (11.1/34.3). On PPD#2 she was discharged with no symptoms of anemia and with normal VS, adequate pain control, ambulating, tolerating PO and voiding spontaneously.

## 2021-03-23 NOTE — DISCHARGE NOTE OB - PLAN OF CARE
Recovery Make a follow-up appointment with your doctor in 6 weeks. No heavy lifting or strenuous activity for six weeks. Nothing in the vagina (such as tampons, douches, intercourse or tub baths) until you see your doctor. You can take 1000mg of Tylenol and/or 600mg of Motrin every 6 hours for pain. You may take Oxycodone for breakthrough pain no more than once every 8 hours. Call your doctor with any signs and symptoms of infection such as fever, chills, nausea or vomiting; if you're unable to tolerate food, have an increase in vaginal bleeding, or have difficulty urinating; or if you have pain that is not relieved by medication. Notify your doctor with any other concerns including feeling depressed or "down".

## 2021-03-23 NOTE — DISCHARGE NOTE OB - HOME CARE AGENCY TYPE OF SERVICE:
Mother Baby Bonding. Nurse will visit patient the day after discharge. Nurse will call patient to schedule visit time.

## 2021-03-23 NOTE — DISCHARGE NOTE OB - CARE PROVIDER_API CALL
[facility],   [facility]   Oncology [facility], Level C  [facility]  Phone: [phone]  Fax: (   )    -  Follow Up Time:

## 2021-03-23 NOTE — DISCHARGE NOTE OB - VISION (WITH CORRECTIVE LENSES IF THE PATIENT USUALLY WEARS THEM):
Breathing spontaneous and unlabored. Breath sounds clear and equal bilaterally with regular rhythm.
Normal vision: sees adequately in most situations; can see medication labels, newsprint

## 2021-03-23 NOTE — DISCHARGE NOTE OB - PROVIDER TOKENS
FREE:[LAST:[[facility]],PHONE:[phone]],FAX:[(   )    -],ADDRESS:[[facility]   Oncology [facility], Level C  [facility]]]

## 2021-03-23 NOTE — CHART NOTE - NSCHARTNOTEFT_GEN_A_CORE
Patient is PPD#1 s/p  w/reported EBL of 250, however this AM H/H dropped from 12.1/37.5 to 9.6/29.5. Seen at bedside with Dr. Diamond. Patient states that she felt "weak" this morning when walking to the bathroom but denies dizziness, lightheadedness, or heavy VB. She has no abdominal pain and is not tender to palpation. Overnight she was tachycardic and she has had pressures consistently in the 90s/50s, compared to her BP of 127/77 on admission.     Vital Signs Last 24 Hrs  T(C): 36.9 (23 Mar 2021 10:00), Max: 39.1 (22 Mar 2021 22:45)  T(F): 98.4 (23 Mar 2021 10:00), Max: 102.4 (22 Mar 2021 22:45)  HR: 90 (23 Mar 2021 10:00) (82 - 132)  BP: 100/65 (23 Mar 2021 10:00) (68/39 - 139/80)  BP(mean): --  RR: 18 (23 Mar 2021 10:00) (16 - 19)  SpO2: 100% (23 Mar 2021 10:00) (77% - 100%)               9.6    14.75 )-----------( 202      ( 03-23 @ 07:26 )             29.5                12.1   11.30 )-----------( 272      ( 03-22 @ 16:21 )             37.5     Concern for acute blood loss anemia. Counseled patient on recommendation for blood transfusion, which she agrees to.     Plan:   - 2u pRBCs  - 4 hour post transfusion CBC    EDEN Valdez, PGY1  Patient evaluated and plan discussed with Dr. Diamond

## 2021-03-23 NOTE — PROVIDER CONTACT NOTE (OTHER) - ASSESSMENT
after delivery oral temperature 39.4 at 2215
low BP and pt reports feeling dizzy while laying semifowlers in bed
VS in flow sheet, pt feeling dizzy and lightheaded

## 2021-03-23 NOTE — DISCHARGE NOTE OB - MEDICATION SUMMARY - MEDICATIONS TO TAKE
I will START or STAY ON the medications listed below when I get home from the hospital:    ibuprofen 600 mg oral tablet  -- 1 tab(s) by mouth every 6 hours  -- Indication: For Pain    acetaminophen 325 mg oral tablet  -- 3 tab(s) by mouth   -- Indication: For Pain    Synthroid 112 mcg (0.112 mg) oral tablet  -- 1 tab(s) by mouth once a day  -- Indication: For Hypothyroid

## 2021-03-23 NOTE — CHART NOTE - NSCHARTNOTEFT_GEN_A_CORE
R4 OB Note    Pt evaluated s/p 1u PRBC. Pt states that she feels better and no longer feels weak. Pt's symptoms improved after breakfast. Pt denies lightheadedness, dizziness, SOB, CP.    Vital Signs Last 24 Hrs  T(C): 36.9 (23 Mar 2021 10:00), Max: 39.1 (22 Mar 2021 22:45)  T(F): 98.4 (23 Mar 2021 10:00), Max: 102.4 (22 Mar 2021 22:45)  HR: 90 (23 Mar 2021 10:00) (82 - 132)  BP: 100/65 (23 Mar 2021 10:00) (68/39 - 139/80)  BP(mean): --  RR: 18 (23 Mar 2021 10:00) (16 - 19)  SpO2: 100% (23 Mar 2021 10:00) (77% - 100%)    I&O's Detail    22 Mar 2021 07:01  -  23 Mar 2021 07:00  --------------------------------------------------------  IN:    Lactated Ringers: 625 mL    Lactated Ringers Bolus: 3000 mL    Oxytocin: 750 mL    Oxytocin.: 6 mL  Total IN: 4381 mL    OUT:    Estimated Blood Loss (mL): 250 mL    Indwelling Catheter - Urethral (mL): 850 mL    Voided (mL): 750 mL  Total OUT: 1850 mL    Total NET: 2531 mL      23 Mar 2021 07:01  -  23 Mar 2021 14:18  --------------------------------------------------------  IN:    Lactated Ringers: 125 mL    PRBCs (Packed Red Blood Cells): 300 mL  Total IN: 425 mL    OUT:  Total OUT: 0 mL    Total NET: 425 mL      Physical Exam:  Gen: AAOx3  CV: RRR  Pulm: CTA b/l  Abd: fundus firm, midline. midlly distended and tympanic. No abdominal tenderness, guarding, or rebound  : Lochia WNL                          9.6    14.75 )-----------( 202      ( 23 Mar 2021 07:26 )             29.5       Plan  -pt s/p 1 u pRBC.  -will obtain 4h post-transufsion CBC  -Will hold 2nd u pRBC at this time as VSS and pt without signs or symtpoms of anemia    D/w Dr. Lobo Griffiths PGY4 R4 OB Note    Pt evaluated s/p 1u PRBC. Pt states that she feels better and no longer feels weak. Pt's symptoms improved after breakfast. Pt denies lightheadedness, dizziness, SOB, CP.    Vital Signs Last 24 Hrs  T(C): 36.9 (23 Mar 2021 10:00), Max: 39.1 (22 Mar 2021 22:45)  T(F): 98.4 (23 Mar 2021 10:00), Max: 102.4 (22 Mar 2021 22:45)  HR: 90 (23 Mar 2021 10:00) (82 - 132)  BP: 100/65 (23 Mar 2021 10:00) (68/39 - 139/80)  BP(mean): --  RR: 18 (23 Mar 2021 10:00) (16 - 19)  SpO2: 100% (23 Mar 2021 10:00) (77% - 100%)    I&O's Detail    22 Mar 2021 07:01  -  23 Mar 2021 07:00  --------------------------------------------------------  IN:    Lactated Ringers: 625 mL    Lactated Ringers Bolus: 3000 mL    Oxytocin: 750 mL    Oxytocin.: 6 mL  Total IN: 4381 mL    OUT:    Estimated Blood Loss (mL): 250 mL    Indwelling Catheter - Urethral (mL): 850 mL    Voided (mL): 750 mL  Total OUT: 1850 mL    Total NET: 2531 mL      23 Mar 2021 07:01  -  23 Mar 2021 14:18  --------------------------------------------------------  IN:    Lactated Ringers: 125 mL    PRBCs (Packed Red Blood Cells): 300 mL  Total IN: 425 mL    OUT:  Total OUT: 0 mL    Total NET: 425 mL      Physical Exam:  Gen: AAOx3  CV: RRR  Pulm: CTA b/l  Abd: fundus firm, midline. midlly distended and tympanic. No abdominal tenderness, guarding, or rebound  : Lochia WNL                          9.6    14.75 )-----------( 202      ( 23 Mar 2021 07:26 )             29.5       Plan  -pt s/p 1 u pRBC.  -will obtain 4h post-transufsion CBC  -Will hold 2nd u pRBC at this time as VSS and pt without signs or symtpoms of anemia    D/w Dr. Lobo Griffiths PGY4    Associate Chief of L & D 03-23-21 @ 16:06    I did speak with the chief resident earlier and agree with her plan of management.  Patient will have a post transfusion CBC 4 hours after the first unit  Carleen Bourne M.D., M.B.A., M.S.

## 2021-03-23 NOTE — DISCHARGE NOTE OB - MATERIALS PROVIDED
Guide to Postpartum Care/Shaken Baby Prevention Handout/Discharge Medication Information for Patients and Families Pocket Guide

## 2021-03-23 NOTE — DISCHARGE NOTE OB - PATIENT PORTAL LINK FT
You can access the FollowMyHealth Patient Portal offered by [facility] by registering at the following website: [email or web address]. By joining [facility] FollowMyHealth portal, you will also be able to view your health information using other applications (apps) compatible with our system.

## 2021-03-23 NOTE — PROGRESS NOTE ADULT - ASSESSMENT
A/P: 34yo  PPD#1 s/p  c/b chorio/endometritis. She is s/p treatment with Invanz and has been afebrile, however she remains mildly tachycardic with BPs in the 90s/60s. However patient appears clinically well, has no uterine tenderness, and states that she feels well.   - 1L LR bolus  - F/u AM CBC   - Pain well controlled, continue current pain regimen  - Increase ambulation, SCDs when not ambulating  - Continue regular diet    EDEN Valdez, PGY1

## 2021-03-23 NOTE — PROGRESS NOTE ADULT - ATTENDING COMMENTS
Associate Chief of L & D (Late entry)     I have not met this patient before today.  she received her care with Dr Smith in [place] and presented with PROM since 3/19 and was admitted by Dr Biggs and delivered by Dr Lechuga    OB Progress Note:  PPD#1    S: 34yo  PPD#1 s/p . Patient reported that she feels weak today     O:  Vitals:  Vital Signs Last 24 Hrs  T(C): 36.9 (23 Mar 2021 10:00), Max: 39.1 (22 Mar 2021 22:45)  T(F): 98.4 (23 Mar 2021 10:00), Max: 102.4 (22 Mar 2021 22:45)  HR: 90 (23 Mar 2021 10:00) (82 - 132)  BP: 100/65 (23 Mar 2021 10:00) (68/39 - 139/80)  RR: 18 (23 Mar 2021 10:00) (16 - 19)  SpO2: 100% (23 Mar 2021 10:00) (77% - 100%)    MEDICATIONS  (STANDING):  acetaminophen   Tablet .. 975 milliGRAM(s) Oral <User Schedule>  diphtheria/tetanus/pertussis (acellular) Vaccine (ADAcel) 0.5 milliLiter(s) IntraMuscular once  ibuprofen  Tablet. 600 milliGRAM(s) Oral every 6 hours  influenza   Vaccine 0.5 milliLiter(s) IntraMuscular once  lactated ringers. 1000 milliLiter(s) (125 mL/Hr) IV Continuous <Continuous>  levothyroxine 112 MICROGram(s) Oral daily  prenatal multivitamin 1 Tablet(s) Oral daily  sodium chloride 0.9% lock flush 3 milliLiter(s) IV Push every 8 hours      Labs:  Blood type: A Positive  Rubella IgG: RPR: Negative                          9.6<L>   14.75<H> >-----------< 202    (  @ 07:26 )             29.5<L>                        12.1   11.30<H> >-----------< 272    (  @ 16:21 )             37.5      Physical Exam:    Abdomen: soft, fundus firm, NT, ND  Vaginal: Lochia wnl  Extremities: No erythema/edema    A/P: 34yo PPD#1 s/p  and repair of 2nd degree laceration and acute blood loss anemia, symptomatic with hypotension and tachycardia and weakness  - Pain well controlled, continue current pain regimen  - Increase ambulation, SCDs when not ambulating  - Continue regular diet  - Transfuse 2 units of PRBC - this was discussed with both the patient and her   Carleen Bourne M.D., M.B.A., M.S.

## 2021-03-23 NOTE — DISCHARGE NOTE OB - COMMUNITY RESOURCE CONTACT NUMBER:
Patient will call to schedule baby's first visit appointment with pediatrician Ant Porter MD [facility] [address] [place] [address] [phone] OR if unable to schedule appointment with Dr. Porter, then,  patient will schedule at [facility]: Division of General Pediatrics [address] [place] [address], [address] [place] [address] ([phone]) so that baby is evaluated by pediatrician 1 to 2 days after hospital discharge.

## 2021-03-23 NOTE — PROVIDER CONTACT NOTE (OTHER) - ACTION/TREATMENT ORDERED:
pt to receive 1 L LR bolus
MD notified, MD will evaluate. pt safety maintained, will continue to monitor.
gentamycin to be given

## 2021-03-23 NOTE — DISCHARGE NOTE OB - CARE PLAN
Principal Discharge DX:	 (vaginal birth after )  Goal:	Recovery  Assessment and plan of treatment:	Make a follow-up appointment with your doctor in 6 weeks. No heavy lifting or strenuous activity for six weeks. Nothing in the vagina (such as tampons, douches, intercourse or tub baths) until you see your doctor. You can take 1000mg of Tylenol and/or 600mg of Motrin every 6 hours for pain. You may take Oxycodone for breakthrough pain no more than once every 8 hours. Call your doctor with any signs and symptoms of infection such as fever, chills, nausea or vomiting; if you're unable to tolerate food, have an increase in vaginal bleeding, or have difficulty urinating; or if you have pain that is not relieved by medication. Notify your doctor with any other concerns including feeling depressed or "down".

## 2021-03-24 VITALS
TEMPERATURE: 98 F | SYSTOLIC BLOOD PRESSURE: 94 MMHG | DIASTOLIC BLOOD PRESSURE: 62 MMHG | RESPIRATION RATE: 17 BRPM | OXYGEN SATURATION: 100 % | HEART RATE: 78 BPM

## 2021-03-24 LAB
HCT VFR BLD CALC: 33.2 % — LOW (ref 34.5–45)
HGB BLD-MCNC: 10.8 G/DL — LOW (ref 11.5–15.5)
MCHC RBC-ENTMCNC: 26.2 PG — LOW (ref 27–34)
MCHC RBC-ENTMCNC: 32.5 GM/DL — SIGNIFICANT CHANGE UP (ref 32–36)
MCV RBC AUTO: 80.4 FL — SIGNIFICANT CHANGE UP (ref 80–100)
NRBC # BLD: 0 /100 WBCS — SIGNIFICANT CHANGE UP
NRBC # FLD: 0 K/UL — SIGNIFICANT CHANGE UP
PLATELET # BLD AUTO: 188 K/UL — SIGNIFICANT CHANGE UP (ref 150–400)
RBC # BLD: 4.13 M/UL — SIGNIFICANT CHANGE UP (ref 3.8–5.2)
RBC # FLD: 16.2 % — HIGH (ref 10.3–14.5)
WBC # BLD: 11.46 K/UL — HIGH (ref 3.8–10.5)
WBC # FLD AUTO: 11.46 K/UL — HIGH (ref 3.8–10.5)

## 2021-03-24 RX ADMIN — Medication 112 MICROGRAM(S): at 06:40

## 2021-03-24 RX ADMIN — Medication 1 TABLET(S): at 11:47

## 2021-03-24 RX ADMIN — SODIUM CHLORIDE 3 MILLILITER(S): 9 INJECTION INTRAMUSCULAR; INTRAVENOUS; SUBCUTANEOUS at 06:15

## 2021-03-24 RX ADMIN — Medication 600 MILLIGRAM(S): at 11:47

## 2021-03-24 RX ADMIN — MAGNESIUM HYDROXIDE 30 MILLILITER(S): 400 TABLET, CHEWABLE ORAL at 11:47

## 2021-03-24 RX ADMIN — Medication 600 MILLIGRAM(S): at 00:30

## 2021-03-24 RX ADMIN — Medication 600 MILLIGRAM(S): at 12:45

## 2021-03-24 NOTE — PROGRESS NOTE ADULT - ASSESSMENT
A/P: 34yo  PPD#2 s/p , s/p transfusion of 1u pRBCs yesterday with appropriate rise in H/H.  Patient is stable and doing well post-partum.   - F/u AM CBC  - Pain well controlled, continue current pain regimen  - Increase ambulation, SCDs when not ambulating  - Continue regular diet    EDEN Valdez, PGY1

## 2021-03-24 NOTE — PROGRESS NOTE ADULT - SUBJECTIVE AND OBJECTIVE BOX
OB Progress Note    S: 36yo  PPD#2 s/p  w/postpartum course c/b chorio/endometritis and acute blood loss anemia necessitating transfusion of 1u pRBCs yesterday. Patient feels well. Pain is well controlled. She is tolerating a regular diet, passing flatus, voiding spontaneously, and ambulating without difficulty. States that weakness is improved from yesterday. Denies headache, lightheadedness, CP, SOB, N/V, or heavy vaginal bleeding.     O:  Vitals:  Vital Signs Last 24 Hrs  T(C): 36.4 (24 Mar 2021 06:00), Max: 37.6 (23 Mar 2021 21:21)  T(F): 97.5 (24 Mar 2021 06:00), Max: 99.7 (23 Mar 2021 21:21)  HR: 72 (24 Mar 2021 06:00) (72 - 93)  BP: 98/64 (24 Mar 2021 06:00) (90/50 - 111/70)  BP(mean): --  RR: 19 (24 Mar 2021 06:00) (18 - 19)  SpO2: 100% (24 Mar 2021 06:00) (98% - 100%)    MEDICATIONS  (STANDING):  acetaminophen   Tablet .. 975 milliGRAM(s) Oral <User Schedule>  diphtheria/tetanus/pertussis (acellular) Vaccine (ADAcel) 0.5 milliLiter(s) IntraMuscular once  ibuprofen  Tablet. 600 milliGRAM(s) Oral every 6 hours  influenza   Vaccine 0.5 milliLiter(s) IntraMuscular once  levothyroxine 112 MICROGram(s) Oral daily  prenatal multivitamin 1 Tablet(s) Oral daily  sodium chloride 0.9% lock flush 3 milliLiter(s) IV Push every 8 hours      Labs:  Blood type: A Positive  Rubella IgG: RPR: Negative                          11.1<L>   15.11<H> >-----------< 223    (  @ 17:55 )             34.3<L>                        9.6<L>   14.75<H> >-----------< 202    (  @ 07:26 )             29.5<L>                        12.1   11.30<H> >-----------< 272    (  @ 16:21 )             37.5      Physical Exam:  General: NAD  Abdomen: soft, nontender, non-distended, fundus firm  Vaginal: Lochia wnl    
OB Progress Note    S: 34yo  PPD#1 s/p . Overnight she was febrile and treated with Invanz for presumed chorio/endometritis. She has been afebrile since midnight. Patient feels well. Pain is well controlled. She is tolerating a regular diet, passing flatus, voiding spontaneously, and ambulating without difficulty. Denies headache, lightheadedness, CP, SOB, N/V, or heavy vaginal bleeding.     O:  Vitals:  Vital Signs Last 24 Hrs  T(C): 37.2 (23 Mar 2021 04:00), Max: 39.1 (22 Mar 2021 22:45)  T(F): 98.9 (23 Mar 2021 04:00), Max: 102.4 (22 Mar 2021 22:45)  HR: 100 (23 Mar 2021 04:00) (82 - 132)  BP: 95/58 (23 Mar 2021 04:00) (68/39 - 139/80)  BP(mean): --  RR: 19 (23 Mar 2021 04:00) (16 - 19)  SpO2: 99% (23 Mar 2021 04:00) (77% - 100%)    MEDICATIONS  (STANDING):  acetaminophen   Tablet .. 975 milliGRAM(s) Oral <User Schedule>  diphtheria/tetanus/pertussis (acellular) Vaccine (ADAcel) 0.5 milliLiter(s) IntraMuscular once  ibuprofen  Tablet. 600 milliGRAM(s) Oral every 6 hours  influenza   Vaccine 0.5 milliLiter(s) IntraMuscular once  levothyroxine 112 MICROGram(s) Oral daily  oxytocin Infusion 333.333 milliUNIT(s)/Min (1000 mL/Hr) IV Continuous <Continuous>  oxytocin Infusion. 2 milliUNIT(s)/Min (2 mL/Hr) IV Continuous <Continuous>  prenatal multivitamin 1 Tablet(s) Oral daily  sodium chloride 0.9% lock flush 3 milliLiter(s) IV Push every 8 hours      Labs:  Blood type: A Positive  Rubella IgG: RPR: Negative                          12.1   11.30<H> >-----------< 272    ( 03-22 @ 16:21 )             37.5      Physical Exam:  General: NAD  Abdomen: soft, nontender, non-distended, fundus firm  Vaginal: Lochia wnl

## 2021-03-24 NOTE — PROGRESS NOTE ADULT - ATTENDING COMMENTS
Associate Chief of L & D (Late entry)     I have not met this patient before today.  she received her care with Dr Smtih in [place] and presented with PROM since 3/19 and was admitted by Dr Biggs and delivered by Dr Lechuga    OB Progress Note:  PPD#2    S: 34yo  PPD#2 s/p . Patient reported that she feels much better after the transfusion    O:  Vital Signs Last 24 Hrs  T(C): 36.4 (24 Mar 2021 06:00), Max: 37.6 (23 Mar 2021 21:21)  T(F): 97.5 (24 Mar 2021 06:00), Max: 99.7 (23 Mar 2021 21:21)  HR: 72 (24 Mar 2021 06:00) (72 - 93)  BP: 98/64 (24 Mar 2021 06:00) (90/50 - 111/70)  RR: 19 (24 Mar 2021 06:00) (18 - 19)  SpO2: 100% (24 Mar 2021 06:00) (98% - 100%)    MEDICATIONS  (STANDING):  acetaminophen   Tablet .. 975 milliGRAM(s) Oral <User Schedule>  diphtheria/tetanus/pertussis (acellular) Vaccine (ADAcel) 0.5 milliLiter(s) IntraMuscular once  ibuprofen  Tablet. 600 milliGRAM(s) Oral every 6 hours  influenza   Vaccine 0.5 milliLiter(s) IntraMuscular once  lactated ringers. 1000 milliLiter(s) (125 mL/Hr) IV Continuous <Continuous>  levothyroxine 112 MICROGram(s) Oral daily  prenatal multivitamin 1 Tablet(s) Oral daily  sodium chloride 0.9% lock flush 3 milliLiter(s) IV Push every 8 hours      Labs:  Blood type: A Positive  Rubella IgG: RPR: Negative    LABS:                        10.8   11.46 )-----------( 188      ( 24 Mar 2021 09:25 )             33.2                         9.6<L>   14.75<H> >-----------< 202    (  @ 07:26 )             29.5<L>                        12.1   11.30<H> >-----------< 272    (  @ 16:21 )             37.5      Physical Exam:    Abdomen: soft, fundus firm, NT, ND  Vaginal: Lochia wnl  Extremities: No erythema/ trace edema    A/P: 34yo PPD#1 s/p  and repair of 2nd degree laceration and acute blood loss anemia, who was symptomatic with hypotension and tachycardia and weakness doing better after 1 unit of PRBC  - CBC  - If stable patient will be discharge home for follow up in the PP clinic    Carleen Bourne M.D., M.B.A., M.S.

## 2021-03-28 LAB — SURGICAL PATHOLOGY STUDY: SIGNIFICANT CHANGE UP

## 2022-09-01 PROBLEM — E03.9 HYPOTHYROIDISM, UNSPECIFIED: Chronic | Status: ACTIVE | Noted: 2021-03-22

## 2022-09-01 PROBLEM — O34.219 MATERNAL CARE FOR UNSPECIFIED TYPE SCAR FROM PREVIOUS CESAREAN DELIVERY: Chronic | Status: ACTIVE | Noted: 2021-03-22

## 2022-09-15 ENCOUNTER — APPOINTMENT (OUTPATIENT)
Dept: SURGERY | Facility: CLINIC | Age: 37
End: 2022-09-15

## 2023-12-20 ENCOUNTER — TRANSCRIPTION ENCOUNTER (OUTPATIENT)
Age: 38
End: 2023-12-20

## 2023-12-20 ENCOUNTER — APPOINTMENT (OUTPATIENT)
Dept: OBGYN | Facility: CLINIC | Age: 38
End: 2023-12-20
Payer: COMMERCIAL

## 2023-12-20 VITALS — WEIGHT: 154 LBS | HEIGHT: 65 IN | BODY MASS INDEX: 25.66 KG/M2

## 2023-12-20 DIAGNOSIS — Z01.419 ENCOUNTER FOR GYNECOLOGICAL EXAMINATION (GENERAL) (ROUTINE) W/OUT ABNORMAL FINDINGS: ICD-10-CM

## 2023-12-20 DIAGNOSIS — N90.89 OTHER SPECIFIED NONINFLAMMATORY DISORDERS OF VULVA AND PERINEUM: ICD-10-CM

## 2023-12-20 DIAGNOSIS — Z78.9 OTHER SPECIFIED HEALTH STATUS: ICD-10-CM

## 2023-12-20 DIAGNOSIS — Z80.3 FAMILY HISTORY OF MALIGNANT NEOPLASM OF BREAST: ICD-10-CM

## 2023-12-20 PROCEDURE — 99213 OFFICE O/P EST LOW 20 MIN: CPT | Mod: 25

## 2023-12-20 PROCEDURE — 99385 PREV VISIT NEW AGE 18-39: CPT

## 2023-12-20 RX ORDER — CLOTRIMAZOLE AND BETAMETHASONE DIPROPIONATE 10; .5 MG/G; MG/G
1-0.05 CREAM TOPICAL
Qty: 1 | Refills: 2 | Status: ACTIVE | COMMUNITY
Start: 2023-12-20 | End: 1900-01-01

## 2023-12-20 NOTE — PHYSICAL EXAM

## 2023-12-20 NOTE — PLAN
[FreeTextEntry1] : Examination Pap smear was done, Patient was treated for vulvitis. Prescription given for mammogram and sonogram, Patient to consider genetic testing and screening discussed with patient in detail, Vulvar care was discussed with the patient

## 2023-12-20 NOTE — HISTORY OF PRESENT ILLNESS
[PapSmeardate] : 2 years [Rt Vulva] : right vulva [Lt Vulva] : left vulva [Regular Cycle Intervals] : periods have been regular [FreeTextEntry1] : 2 weeks. ago [No] : Patient does not have concerns regarding sex [Currently Active] : currently active

## 2023-12-27 LAB
CYTOLOGY CVX/VAG DOC THIN PREP: ABNORMAL
HPV HIGH+LOW RISK DNA PNL CVX: NOT DETECTED

## 2023-12-29 ENCOUNTER — APPOINTMENT (OUTPATIENT)
Dept: INTERNAL MEDICINE | Facility: CLINIC | Age: 38
End: 2023-12-29
Payer: COMMERCIAL

## 2023-12-29 VITALS
OXYGEN SATURATION: 99 % | HEIGHT: 65 IN | HEART RATE: 62 BPM | SYSTOLIC BLOOD PRESSURE: 102 MMHG | WEIGHT: 151 LBS | RESPIRATION RATE: 18 BRPM | BODY MASS INDEX: 25.16 KG/M2 | TEMPERATURE: 98.4 F | DIASTOLIC BLOOD PRESSURE: 68 MMHG

## 2023-12-29 DIAGNOSIS — L65.9 NONSCARRING HAIR LOSS, UNSPECIFIED: ICD-10-CM

## 2023-12-29 DIAGNOSIS — Z00.00 ENCOUNTER FOR GENERAL ADULT MEDICAL EXAMINATION W/OUT ABNORMAL FINDINGS: ICD-10-CM

## 2023-12-29 DIAGNOSIS — Z23 ENCOUNTER FOR IMMUNIZATION: ICD-10-CM

## 2023-12-29 PROCEDURE — 99203 OFFICE O/P NEW LOW 30 MIN: CPT | Mod: 25

## 2023-12-29 PROCEDURE — 99385 PREV VISIT NEW AGE 18-39: CPT | Mod: 25

## 2023-12-29 PROCEDURE — G0444 DEPRESSION SCREEN ANNUAL: CPT | Mod: 59

## 2023-12-29 PROCEDURE — 90471 IMMUNIZATION ADMIN: CPT

## 2023-12-29 PROCEDURE — 90686 IIV4 VACC NO PRSV 0.5 ML IM: CPT

## 2023-12-29 NOTE — PHYSICAL EXAM
[No Edema] : there was no peripheral edema [No Extremity Clubbing/Cyanosis] : no extremity clubbing/cyanosis [Soft] : abdomen soft [Non Tender] : non-tender [Non-distended] : non-distended [No CVA Tenderness] : no CVA  tenderness [No Joint Swelling] : no joint swelling [No Rash] : no rash [Coordination Grossly Intact] : coordination grossly intact [No Focal Deficits] : no focal deficits [Normal Gait] : normal gait [Normal] : affect was normal and insight and judgment were intact

## 2023-12-29 NOTE — HEALTH RISK ASSESSMENT
[Good] : ~his/her~  mood as  good [No] : No [No falls in past year] : Patient reported no falls in the past year [Never] : Never [0] : 2) Feeling down, depressed, or hopeless: Not at all (0) [PHQ-2 Negative - No further assessment needed] : PHQ-2 Negative - No further assessment needed [[id number]] : 0

## 2023-12-29 NOTE — HISTORY OF PRESENT ILLNESS
[FreeTextEntry1] : NP CPE [de-identified] : Here for CPE Concerned about recent hair loss, feels has bald spots-has hx of RAMYA-not on iron

## 2023-12-29 NOTE — PLAN
[FreeTextEntry1] : Reviewed age appropriate preventive screening with patient today and importance of regular screening as indicated. Encouraged exercise of at least 30 mins daily of moderate activity as tolerated.  If unable to participate in moderate activity, encouraged walking daily for 20 to 30mins. Discussed healthy dietary intake of vegetables, whole grains, lean proteins with avoidance of high sugar and sodium intake. Completed labs in office today, will await results and notify patient accordingly Reviewed and assessed depression screening and mood today with patient  -Labs for evaluation of hair loss

## 2024-01-02 LAB
25(OH)D3 SERPL-MCNC: 21.8 NG/ML
ALBUMIN SERPL ELPH-MCNC: 4.5 G/DL
ALP BLD-CCNC: 65 U/L
ALT SERPL-CCNC: 12 U/L
ANION GAP SERPL CALC-SCNC: 12 MMOL/L
AST SERPL-CCNC: 14 U/L
BASOPHILS # BLD AUTO: 0.09 K/UL
BASOPHILS NFR BLD AUTO: 1.4 %
BILIRUB SERPL-MCNC: 0.6 MG/DL
BUN SERPL-MCNC: 19 MG/DL
CALCIUM SERPL-MCNC: 9.6 MG/DL
CHLORIDE SERPL-SCNC: 104 MMOL/L
CHOLEST SERPL-MCNC: 194 MG/DL
CO2 SERPL-SCNC: 24 MMOL/L
CREAT SERPL-MCNC: 0.79 MG/DL
EGFR: 99 ML/MIN/1.73M2
EOSINOPHIL # BLD AUTO: 0.52 K/UL
EOSINOPHIL NFR BLD AUTO: 8.1 %
ESTIMATED AVERAGE GLUCOSE: 108 MG/DL
FERRITIN SERPL-MCNC: 18 NG/ML
GLUCOSE SERPL-MCNC: 99 MG/DL
HBA1C MFR BLD HPLC: 5.4 %
HCT VFR BLD CALC: 40.1 %
HDLC SERPL-MCNC: 42 MG/DL
HGB BLD-MCNC: 12.7 G/DL
IMM GRANULOCYTES NFR BLD AUTO: 0.3 %
IRON SATN MFR SERPL: 19 %
IRON SERPL-MCNC: 74 UG/DL
LDLC SERPL CALC-MCNC: 137 MG/DL
LYMPHOCYTES # BLD AUTO: 1.88 K/UL
LYMPHOCYTES NFR BLD AUTO: 29.2 %
MAN DIFF?: NORMAL
MCHC RBC-ENTMCNC: 25.6 PG
MCHC RBC-ENTMCNC: 31.7 GM/DL
MCV RBC AUTO: 80.8 FL
MONOCYTES # BLD AUTO: 0.53 K/UL
MONOCYTES NFR BLD AUTO: 8.2 %
NEUTROPHILS # BLD AUTO: 3.4 K/UL
NEUTROPHILS NFR BLD AUTO: 52.8 %
NONHDLC SERPL-MCNC: 152 MG/DL
PLATELET # BLD AUTO: 324 K/UL
POTASSIUM SERPL-SCNC: 4.4 MMOL/L
PROT SERPL-MCNC: 7.6 G/DL
RBC # BLD: 4.96 M/UL
RBC # FLD: 14.2 %
SODIUM SERPL-SCNC: 140 MMOL/L
TIBC SERPL-MCNC: 381 UG/DL
TRIGL SERPL-MCNC: 80 MG/DL
TSH SERPL-ACNC: 2.77 UIU/ML
UIBC SERPL-MCNC: 307 UG/DL
WBC # FLD AUTO: 6.44 K/UL

## 2024-01-03 RX ORDER — LEVOTHYROXINE SODIUM 0.12 MG/1
125 TABLET ORAL
Qty: 90 | Refills: 1 | Status: ACTIVE | COMMUNITY
Start: 1900-01-01 | End: 1900-01-01

## 2024-01-08 ENCOUNTER — RESULT REVIEW (OUTPATIENT)
Age: 39
End: 2024-01-08

## 2024-01-08 ENCOUNTER — APPOINTMENT (OUTPATIENT)
Dept: MAMMOGRAPHY | Facility: CLINIC | Age: 39
End: 2024-01-08
Payer: COMMERCIAL

## 2024-01-08 ENCOUNTER — APPOINTMENT (OUTPATIENT)
Dept: ULTRASOUND IMAGING | Facility: CLINIC | Age: 39
End: 2024-01-08
Payer: COMMERCIAL

## 2024-01-08 PROCEDURE — 76641 ULTRASOUND BREAST COMPLETE: CPT | Mod: 50

## 2024-01-08 PROCEDURE — 77067 SCR MAMMO BI INCL CAD: CPT

## 2024-01-08 PROCEDURE — 77063 BREAST TOMOSYNTHESIS BI: CPT

## 2024-01-10 ENCOUNTER — NON-APPOINTMENT (OUTPATIENT)
Age: 39
End: 2024-01-10

## 2024-01-11 ENCOUNTER — APPOINTMENT (OUTPATIENT)
Dept: ULTRASOUND IMAGING | Facility: CLINIC | Age: 39
End: 2024-01-11
Payer: COMMERCIAL

## 2024-01-11 ENCOUNTER — RESULT REVIEW (OUTPATIENT)
Age: 39
End: 2024-01-11

## 2024-01-11 ENCOUNTER — APPOINTMENT (OUTPATIENT)
Dept: MAMMOGRAPHY | Facility: CLINIC | Age: 39
End: 2024-01-11
Payer: COMMERCIAL

## 2024-01-11 PROCEDURE — 77066 DX MAMMO INCL CAD BI: CPT

## 2024-01-11 PROCEDURE — G0279: CPT

## 2024-01-11 PROCEDURE — 76642 ULTRASOUND BREAST LIMITED: CPT | Mod: 50

## 2024-01-11 PROCEDURE — 77062 BREAST TOMOSYNTHESIS BI: CPT

## 2024-01-13 ENCOUNTER — NON-APPOINTMENT (OUTPATIENT)
Age: 39
End: 2024-01-13

## 2024-03-10 NOTE — DISCHARGE NOTE OB - COMMUNITY RESOURCE NAME:
89
Patient will call to schedule a postpartum  appointment for 4 to 6 weeks after delivery date at [facility]  [phone].

## 2024-04-05 ENCOUNTER — RESULT CHARGE (OUTPATIENT)
Age: 39
End: 2024-04-05

## 2024-04-05 ENCOUNTER — APPOINTMENT (OUTPATIENT)
Dept: INTERNAL MEDICINE | Facility: CLINIC | Age: 39
End: 2024-04-05
Payer: COMMERCIAL

## 2024-04-05 ENCOUNTER — APPOINTMENT (OUTPATIENT)
Dept: ORTHOPEDIC SURGERY | Facility: CLINIC | Age: 39
End: 2024-04-05
Payer: COMMERCIAL

## 2024-04-05 VITALS
RESPIRATION RATE: 18 BRPM | BODY MASS INDEX: 25.49 KG/M2 | WEIGHT: 153 LBS | TEMPERATURE: 98.3 F | OXYGEN SATURATION: 100 % | SYSTOLIC BLOOD PRESSURE: 131 MMHG | DIASTOLIC BLOOD PRESSURE: 73 MMHG | HEART RATE: 61 BPM | HEIGHT: 65 IN

## 2024-04-05 VITALS — HEIGHT: 65 IN | BODY MASS INDEX: 24.99 KG/M2 | WEIGHT: 150 LBS

## 2024-04-05 DIAGNOSIS — E04.1 NONTOXIC SINGLE THYROID NODULE: ICD-10-CM

## 2024-04-05 DIAGNOSIS — S93.491A SPRAIN OF OTHER LIGAMENT OF RIGHT ANKLE, INITIAL ENCOUNTER: ICD-10-CM

## 2024-04-05 DIAGNOSIS — S93.621A SPRAIN OF TARSOMETATARSAL LIGAMENT OF RIGHT FOOT, INITIAL ENCOUNTER: ICD-10-CM

## 2024-04-05 DIAGNOSIS — S99.911A UNSPECIFIED INJURY OF RIGHT ANKLE, INITIAL ENCOUNTER: ICD-10-CM

## 2024-04-05 PROCEDURE — 99213 OFFICE O/P EST LOW 20 MIN: CPT

## 2024-04-05 PROCEDURE — 29515 APPLICATION SHORT LEG SPLINT: CPT | Mod: RT

## 2024-04-05 PROCEDURE — 73600 X-RAY EXAM OF ANKLE: CPT | Mod: RT

## 2024-04-05 PROCEDURE — 99203 OFFICE O/P NEW LOW 30 MIN: CPT | Mod: 25

## 2024-04-05 PROCEDURE — 73630 X-RAY EXAM OF FOOT: CPT | Mod: RT

## 2024-04-05 PROCEDURE — 29126 APPL SHORT ARM SPLINT DYN: CPT | Mod: RT

## 2024-04-05 RX ORDER — CELECOXIB 200 MG/1
200 CAPSULE ORAL TWICE DAILY
Qty: 60 | Refills: 2 | Status: ACTIVE | COMMUNITY
Start: 2024-04-05 | End: 1900-01-01

## 2024-04-05 NOTE — HISTORY OF PRESENT ILLNESS
[9] : 9 [Dull/Aching] : dull/aching [Localized] : localized [Sharp] : sharp [Constant] : constant [Standing] : standing [Walking] : walking [Stairs] : stairs [de-identified] : 4/5/24: 39 y/o patient fell down the stairs and injured her right ankle/foot on 4/1/24, is having difficulty walking. no prior hx. Has tried Ibuprofen for the pain.  PMH: Hypothyroidism. Allergies: NKDA.   [] : Post Surgical Visit: no [FreeTextEntry1] : right ankle/foot [FreeTextEntry3] : 4/1/24

## 2024-04-05 NOTE — ASSESSMENT
[FreeTextEntry1] : The patient was advised of the diagnosis. The natural history of the pathology was explained in full to the patient in layman's terms. All questions were answered. The risks and benefits of surgical and non-surgical treatment alternatives were explained in full to the patient.  Pt has long CAM walker and will continue to ambulate with this for comfort. Pt provided Celebrex 200 mg bid x 10 days. MRI of the right foot  RTO s/p MRI to discuss results / tx options.  NSAIDs recommended.  Patient warned of risk of NSAID medication to stomach and GI tract, risk of increase blood pressure, cardiac risk, and risk of fluid retention.  The patient should clear taking medication with internist/PMD if any problem with heart, blood pressure, or GI system exists.

## 2024-04-05 NOTE — IMAGING
[de-identified] : Right ankle exam: Ortiz Test: negative Homans Sign: negative TTP: milt ttp over ATFL Anterior Drawer Test: negative Talar Tilt Test: negative Circumduction: negative Strength Testin/5 all planes Gait examination: antalgic to the right   foot: mild swelling TTP: over 2nd-5th TMT joints Forefoot compression: + pain no instability. EHL / FHL strength: 5/5 2+ DP and PT pulses are noted. All digits are nvi with FAROM.

## 2024-04-06 NOTE — HISTORY OF PRESENT ILLNESS
[FreeTextEntry8] : Ankle injury  Missed step on stairs, fell onto right ankle.  States thinks she heard cracking sensation. She went to the ER but xray was limited due to severe swelling. Swelling has decreased but continues to have pain with ambulation.  Needs referral to see orthopedics. Also requesting referral for continued care for hypothyroidism with endocrinology, last TSH normal

## 2024-04-06 NOTE — PHYSICAL EXAM
[Normal] : no acute distress, well nourished, well developed and well-appearing [de-identified] : Right ankle with mild swelling, pain w/ compression, pulse intact

## 2024-04-10 ENCOUNTER — APPOINTMENT (OUTPATIENT)
Dept: ORTHOPEDIC SURGERY | Facility: CLINIC | Age: 39
End: 2024-04-10

## 2024-04-22 ENCOUNTER — APPOINTMENT (OUTPATIENT)
Dept: ORTHOPEDIC SURGERY | Facility: CLINIC | Age: 39
End: 2024-04-22
Payer: COMMERCIAL

## 2024-04-22 DIAGNOSIS — Z86.39 PERSONAL HISTORY OF OTHER ENDOCRINE, NUTRITIONAL AND METABOLIC DISEASE: ICD-10-CM

## 2024-04-22 PROCEDURE — 99214 OFFICE O/P EST MOD 30 MIN: CPT

## 2024-04-22 NOTE — DATA REVIEWED
Patient came in for BP check. 154/68 in clinic. Brought home automatic BP machine and got 140/103. Brought home readings.      [Outside X-rays] : outside x-rays [Ankle] : ankle [Foot] : foot [Report was reviewed and noted in the chart] : The report was reviewed and noted in the chart [I reviewed the films/CD] : I reviewed the films/CD [FreeTextEntry1] : [facility]: No acute fractures or bony abnormalities noted.

## 2024-04-22 NOTE — DISCUSSION/SUMMARY
[de-identified] : Pt. can wean from CAM boot into supportive footwear. Pt. recommended a course of PT. Ice to affected area. Activity modification. Avoid high impact activities.

## 2024-04-22 NOTE — HISTORY OF PRESENT ILLNESS
[de-identified] : Pt is a 38 year old female who presents today for evaluation of her right foot/ankle. Pt states that she fell down the stairs on 4/1/24. Evaluated at [facility], provided CAM boot for support which she is wearing today. Ice to affected area. No previous injury/problem with right foot/ankle. She reports feeling better but still having pain.

## 2024-04-22 NOTE — PHYSICAL EXAM
[Right] : right foot and ankle [NL (40)] : plantar flexion 40 degrees [NL 30)] : inversion 30 degrees [5___] : eversion 5[unfilled]/5 [2+] : posterior tibialis pulse: 2+ [Normal] : saphenous nerve sensation normal [] : Sensation present to light touch in all distributions [de-identified] : WB in CAM boot.  [TWNoteComboBox7] : dorsiflexion 15 degrees [de-identified] : eversion 15 degrees

## 2024-04-25 ENCOUNTER — NON-APPOINTMENT (OUTPATIENT)
Age: 39
End: 2024-04-25

## 2024-05-13 ENCOUNTER — APPOINTMENT (OUTPATIENT)
Dept: ORTHOPEDIC SURGERY | Facility: CLINIC | Age: 39
End: 2024-05-13
Payer: COMMERCIAL

## 2024-05-13 DIAGNOSIS — S93.401D SPRAIN OF UNSPECIFIED LIGAMENT OF RIGHT ANKLE, SUBSEQUENT ENCOUNTER: ICD-10-CM

## 2024-05-13 PROCEDURE — 99213 OFFICE O/P EST LOW 20 MIN: CPT

## 2024-05-13 NOTE — DISCUSSION/SUMMARY
[de-identified] : Patient is doing better. Supportive sneakers with ankle compression sleeve is recommended. Icing/nsaids prn. PT is still encouraged.

## 2024-05-13 NOTE — HISTORY OF PRESENT ILLNESS
[de-identified] : Patient returns for her right ankle sprain from 4/1/24.  She has been wb in flat shoes and doing home exercises.  She has yet to start PT.  She reports doing much better, but still has some mild pain.

## 2024-05-13 NOTE — PHYSICAL EXAM
[Right] : right foot and ankle [NL (40)] : plantar flexion 40 degrees [NL 30)] : inversion 30 degrees [5___] : eversion 5[unfilled]/5 [2+] : posterior tibialis pulse: 2+ [Normal] : saphenous nerve sensation normal [] : patient ambulates without assistive device [TWNoteComboBox7] : dorsiflexion 15 degrees [de-identified] : eversion 15 degrees

## 2024-06-17 ENCOUNTER — APPOINTMENT (OUTPATIENT)
Dept: ORTHOPEDIC SURGERY | Facility: CLINIC | Age: 39
End: 2024-06-17

## 2024-06-21 ENCOUNTER — APPOINTMENT (OUTPATIENT)
Dept: ORTHOPEDIC SURGERY | Facility: CLINIC | Age: 39
End: 2024-06-21

## 2024-08-01 DIAGNOSIS — N64.89 OTHER SPECIFIED DISORDERS OF BREAST: ICD-10-CM

## 2024-08-02 ENCOUNTER — APPOINTMENT (OUTPATIENT)
Dept: MAMMOGRAPHY | Facility: CLINIC | Age: 39
End: 2024-08-02
Payer: COMMERCIAL

## 2024-08-02 ENCOUNTER — RESULT REVIEW (OUTPATIENT)
Age: 39
End: 2024-08-02

## 2024-08-02 PROCEDURE — G0279: CPT | Mod: LT

## 2024-08-02 PROCEDURE — 77065 DX MAMMO INCL CAD UNI: CPT | Mod: LT

## 2024-08-02 PROCEDURE — 77061 BREAST TOMOSYNTHESIS UNI: CPT | Mod: LT

## 2025-03-24 NOTE — OB PROVIDER TRIAGE NOTE - NSHPPHYSICALEXAM_GEN_ALL_CORE
Medication:    potassium CHLORIDE (KLOR-CON M) 20 MEQ tim ER tablet    passed protocol.   Last office visit date: 3/18/2025  Next appointment scheduled?: Yes 4/21/2025  Number of refills given: 2  Potassium Supplement Refill Protocol - 12 Month Protocol [id number]/2025 10:11 AM   Protocol Details Seen by prescribing provider or same department within the last 12 months or has a future appt in 3 months - IF FAILED PLEASE LOOK AT CHART REVIEW FOR LAST VISIT AND PROCEED ACCORDINGLY    Normal Potassium within last 12 months looking at last value -- IF CRITERIA FAILED REFER TO PROTOCOL DETAILS    Medication (including dose and sig) on current meds list                  Medication: pantoprazole (PROTONIX) 40 MG tablet  passed protocol.   Last office visit date: 3/18/2025  Next appointment scheduled?: Yes  4/21/2025  Number of refills given: 2       Proton Pump Inhibitor (PPI) Refill Protocol - 12 Month Protocol [id number]/24/2025 10:11 AM   Protocol Details Seen by prescribing provider or same department within the last 12 months or has a future appt in 3 months - IF FAILED PLEASE LOOK AT CHART REVIEW FOR LAST VISIT AND PROCEED ACCORDINGLY    Not on Clopidogrel (Plavix) or if on, refill is for Pantoprazole (Protonix)    Medication (including dose and sig) on current meds list        
Vital Signs Last 24 Hrs  T(C): 37 (22 Mar 2021 15:17), Max: 37 (22 Mar 2021 15:17)  T(F): 98.6 (22 Mar 2021 15:17), Max: 98.6 (22 Mar 2021 15:17)  HR: 114 (22 Mar 2021 15:17) (114 - 116)  BP: 127/77 (22 Mar 2021 15:17) (127/77 - 127/-)  BP(mean): --  RR: 16 (22 Mar 2021 15:17) (16 - 16)  SpO2: 97% (22 Mar 2021 15:16) (97% - 97%)    Abdomen gravid, soft and nontender  Continue EFM  Grossly ruptured; clear fluids  SVE - 4-5/70/-3  Cephalic presentation  GBS - negative  Clinical EFW - 3629G

## 2025-04-14 ENCOUNTER — APPOINTMENT (OUTPATIENT)
Dept: INTERNAL MEDICINE | Facility: CLINIC | Age: 40
End: 2025-04-14
Payer: COMMERCIAL

## 2025-04-14 VITALS
HEART RATE: 66 BPM | TEMPERATURE: 98 F | OXYGEN SATURATION: 100 % | HEIGHT: 65 IN | WEIGHT: 150 LBS | BODY MASS INDEX: 24.99 KG/M2 | SYSTOLIC BLOOD PRESSURE: 105 MMHG | RESPIRATION RATE: 18 BRPM | DIASTOLIC BLOOD PRESSURE: 71 MMHG

## 2025-04-14 DIAGNOSIS — R68.83 CHILLS (WITHOUT FEVER): ICD-10-CM

## 2025-04-14 DIAGNOSIS — R52 PAIN, UNSPECIFIED: ICD-10-CM

## 2025-04-14 DIAGNOSIS — R51.9 HEADACHE, UNSPECIFIED: ICD-10-CM

## 2025-04-14 DIAGNOSIS — R53.83 OTHER FATIGUE: ICD-10-CM

## 2025-04-14 PROCEDURE — 87804 INFLUENZA ASSAY W/OPTIC: CPT | Mod: QW

## 2025-04-14 PROCEDURE — 87811 SARS-COV-2 COVID19 W/OPTIC: CPT | Mod: QW

## 2025-04-14 PROCEDURE — 99214 OFFICE O/P EST MOD 30 MIN: CPT

## 2025-04-14 PROCEDURE — G2211 COMPLEX E/M VISIT ADD ON: CPT | Mod: NC

## 2025-04-14 RX ORDER — IBUPROFEN 800 MG/1
800 TABLET, FILM COATED ORAL 3 TIMES DAILY
Qty: 15 | Refills: 0 | Status: ACTIVE | COMMUNITY
Start: 2025-04-14 | End: 1900-01-01

## 2025-04-15 LAB
FLUAV SPEC QL CULT: NEGATIVE
FLUBV AG SPEC QL IA: NEGATIVE
INFLUENZA A RESULT: NOT DETECTED
INFLUENZA B RESULT: NOT DETECTED
RESP SYN VIRUS RESULT: NOT DETECTED
SARS-COV-2 AG RESP QL IA.RAPID: NEGATIVE
SARS-COV-2 RESULT: NOT DETECTED

## 2025-04-17 ENCOUNTER — TRANSCRIPTION ENCOUNTER (OUTPATIENT)
Age: 40
End: 2025-04-17

## 2025-04-18 ENCOUNTER — NON-APPOINTMENT (OUTPATIENT)
Age: 40
End: 2025-04-18

## 2025-04-18 DIAGNOSIS — R51.9 HEADACHE, UNSPECIFIED: ICD-10-CM

## 2025-04-18 RX ORDER — UBROGEPANT 50 MG/1
50 TABLET ORAL TWICE DAILY
Qty: 14 | Refills: 0 | Status: ACTIVE | COMMUNITY
Start: 2025-04-18 | End: 1900-01-01

## 2025-08-21 ENCOUNTER — APPOINTMENT (OUTPATIENT)
Dept: OBGYN | Facility: CLINIC | Age: 40
End: 2025-08-21
Payer: COMMERCIAL

## 2025-08-21 VITALS — BODY MASS INDEX: 25.66 KG/M2 | HEIGHT: 65 IN | WEIGHT: 154 LBS

## 2025-08-21 DIAGNOSIS — Z01.419 ENCOUNTER FOR GYNECOLOGICAL EXAMINATION (GENERAL) (ROUTINE) W/OUT ABNORMAL FINDINGS: ICD-10-CM

## 2025-08-21 DIAGNOSIS — N90.89 OTHER SPECIFIED NONINFLAMMATORY DISORDERS OF VULVA AND PERINEUM: ICD-10-CM

## 2025-08-21 PROCEDURE — 99395 PREV VISIT EST AGE 18-39: CPT

## 2025-08-21 PROCEDURE — 99459 PELVIC EXAMINATION: CPT | Mod: NC

## 2025-08-25 ENCOUNTER — APPOINTMENT (OUTPATIENT)
Dept: MAMMOGRAPHY | Facility: CLINIC | Age: 40
End: 2025-08-25

## 2025-08-25 ENCOUNTER — APPOINTMENT (OUTPATIENT)
Dept: ULTRASOUND IMAGING | Facility: CLINIC | Age: 40
End: 2025-08-25

## 2025-08-25 LAB — HPV HIGH+LOW RISK DNA PNL CVX: NOT DETECTED

## 2025-08-28 LAB — CYTOLOGY CVX/VAG DOC THIN PREP: NORMAL

## 2025-09-16 ENCOUNTER — APPOINTMENT (OUTPATIENT)
Dept: DERMATOLOGY | Facility: CLINIC | Age: 40
End: 2025-09-16